# Patient Record
Sex: FEMALE | Race: WHITE | HISPANIC OR LATINO | Employment: FULL TIME | URBAN - METROPOLITAN AREA
[De-identification: names, ages, dates, MRNs, and addresses within clinical notes are randomized per-mention and may not be internally consistent; named-entity substitution may affect disease eponyms.]

---

## 2018-12-28 ENCOUNTER — APPOINTMENT (OUTPATIENT)
Dept: CT IMAGING | Facility: CLINIC | Age: 53
End: 2018-12-28
Attending: EMERGENCY MEDICINE

## 2018-12-28 ENCOUNTER — NURSE TRIAGE (OUTPATIENT)
Dept: NURSING | Facility: CLINIC | Age: 53
End: 2018-12-28

## 2018-12-28 ENCOUNTER — HOSPITAL ENCOUNTER (EMERGENCY)
Facility: CLINIC | Age: 53
Discharge: HOME | End: 2018-12-28
Attending: EMERGENCY MEDICINE | Admitting: EMERGENCY MEDICINE

## 2018-12-28 ENCOUNTER — OFFICE VISIT (OUTPATIENT)
Dept: URGENT CARE | Facility: URGENT CARE | Age: 53
End: 2018-12-28

## 2018-12-28 VITALS
TEMPERATURE: 97.3 F | WEIGHT: 252 LBS | SYSTOLIC BLOOD PRESSURE: 132 MMHG | HEART RATE: 67 BPM | DIASTOLIC BLOOD PRESSURE: 82 MMHG | OXYGEN SATURATION: 96 %

## 2018-12-28 VITALS
DIASTOLIC BLOOD PRESSURE: 77 MMHG | WEIGHT: 251.32 LBS | HEART RATE: 72 BPM | TEMPERATURE: 97.3 F | OXYGEN SATURATION: 97 % | SYSTOLIC BLOOD PRESSURE: 120 MMHG

## 2018-12-28 DIAGNOSIS — R10.9 ABDOMINAL PAIN, RIGHT LATERAL: ICD-10-CM

## 2018-12-28 DIAGNOSIS — R10.11 RUQ ABDOMINAL PAIN: Primary | ICD-10-CM

## 2018-12-28 LAB
ALBUMIN SERPL-MCNC: 3.8 G/DL (ref 3.4–5)
ALBUMIN UR-MCNC: NEGATIVE MG/DL
ALP SERPL-CCNC: 71 U/L (ref 40–150)
ALT SERPL W P-5'-P-CCNC: 48 U/L (ref 0–50)
ANION GAP SERPL CALCULATED.3IONS-SCNC: 9 MMOL/L (ref 3–14)
APPEARANCE UR: ABNORMAL
AST SERPL W P-5'-P-CCNC: 36 U/L (ref 0–45)
BACTERIA #/AREA URNS HPF: ABNORMAL /HPF
BASOPHILS # BLD AUTO: 0 10E9/L (ref 0–0.2)
BASOPHILS NFR BLD AUTO: 0.9 %
BILIRUB SERPL-MCNC: 0.6 MG/DL (ref 0.2–1.3)
BILIRUB UR QL STRIP: NEGATIVE
BUN SERPL-MCNC: 13 MG/DL (ref 7–30)
CALCIUM SERPL-MCNC: 8.8 MG/DL (ref 8.5–10.1)
CHLORIDE SERPL-SCNC: 105 MMOL/L (ref 94–109)
CO2 SERPL-SCNC: 25 MMOL/L (ref 20–32)
COLOR UR AUTO: YELLOW
CREAT SERPL-MCNC: 0.84 MG/DL (ref 0.52–1.04)
DIFFERENTIAL METHOD BLD: NORMAL
EOSINOPHIL # BLD AUTO: 0.1 10E9/L (ref 0–0.7)
EOSINOPHIL NFR BLD AUTO: 2.9 %
ERYTHROCYTE [DISTWIDTH] IN BLOOD BY AUTOMATED COUNT: 12.7 % (ref 10–15)
GFR SERPL CREATININE-BSD FRML MDRD: 78 ML/MIN/{1.73_M2}
GLUCOSE SERPL-MCNC: 100 MG/DL (ref 70–99)
GLUCOSE UR STRIP-MCNC: NEGATIVE MG/DL
HCT VFR BLD AUTO: 42.5 % (ref 35–47)
HGB BLD-MCNC: 14.3 G/DL (ref 11.7–15.7)
HGB UR QL STRIP: NEGATIVE
HYALINE CASTS #/AREA URNS LPF: 1 /LPF (ref 0–2)
IMM GRANULOCYTES # BLD: 0 10E9/L (ref 0–0.4)
IMM GRANULOCYTES NFR BLD: 0.2 %
KETONES UR STRIP-MCNC: 5 MG/DL
LEUKOCYTE ESTERASE UR QL STRIP: NEGATIVE
LIPASE SERPL-CCNC: 97 U/L (ref 73–393)
LYMPHOCYTES # BLD AUTO: 1.9 10E9/L (ref 0.8–5.3)
LYMPHOCYTES NFR BLD AUTO: 42.2 %
MCH RBC QN AUTO: 31.2 PG (ref 26.5–33)
MCHC RBC AUTO-ENTMCNC: 33.6 G/DL (ref 31.5–36.5)
MCV RBC AUTO: 93 FL (ref 78–100)
MONOCYTES # BLD AUTO: 0.4 10E9/L (ref 0–1.3)
MONOCYTES NFR BLD AUTO: 9.7 %
MUCOUS THREADS #/AREA URNS LPF: PRESENT /LPF
NEUTROPHILS # BLD AUTO: 2 10E9/L (ref 1.6–8.3)
NEUTROPHILS NFR BLD AUTO: 44.1 %
NITRATE UR QL: NEGATIVE
NRBC # BLD AUTO: 0 10*3/UL
NRBC BLD AUTO-RTO: 0 /100
PH UR STRIP: 6 PH (ref 5–7)
PLATELET # BLD AUTO: 212 10E9/L (ref 150–450)
POTASSIUM SERPL-SCNC: 4 MMOL/L (ref 3.4–5.3)
PROT SERPL-MCNC: 7.7 G/DL (ref 6.8–8.8)
RBC # BLD AUTO: 4.59 10E12/L (ref 3.8–5.2)
RBC #/AREA URNS AUTO: 1 /HPF (ref 0–2)
SODIUM SERPL-SCNC: 139 MMOL/L (ref 133–144)
SOURCE: ABNORMAL
SP GR UR STRIP: 1.02 (ref 1–1.03)
SQUAMOUS #/AREA URNS AUTO: 4 /HPF (ref 0–1)
UROBILINOGEN UR STRIP-MCNC: 0 MG/DL (ref 0–2)
WBC # BLD AUTO: 4.5 10E9/L (ref 4–11)
WBC #/AREA URNS AUTO: 3 /HPF (ref 0–5)

## 2018-12-28 PROCEDURE — 74177 CT ABD & PELVIS W/CONTRAST: CPT

## 2018-12-28 PROCEDURE — 25000128 H RX IP 250 OP 636: Performed by: EMERGENCY MEDICINE

## 2018-12-28 PROCEDURE — 81001 URINALYSIS AUTO W/SCOPE: CPT | Performed by: EMERGENCY MEDICINE

## 2018-12-28 PROCEDURE — 25000132 ZZH RX MED GY IP 250 OP 250 PS 637: Performed by: EMERGENCY MEDICINE

## 2018-12-28 PROCEDURE — 99285 EMERGENCY DEPT VISIT HI MDM: CPT | Mod: 25

## 2018-12-28 PROCEDURE — 85025 COMPLETE CBC W/AUTO DIFF WBC: CPT | Performed by: EMERGENCY MEDICINE

## 2018-12-28 PROCEDURE — 96374 THER/PROPH/DIAG INJ IV PUSH: CPT | Mod: 59

## 2018-12-28 PROCEDURE — 80053 COMPREHEN METABOLIC PANEL: CPT | Performed by: EMERGENCY MEDICINE

## 2018-12-28 PROCEDURE — 83690 ASSAY OF LIPASE: CPT | Performed by: EMERGENCY MEDICINE

## 2018-12-28 PROCEDURE — 96361 HYDRATE IV INFUSION ADD-ON: CPT

## 2018-12-28 RX ORDER — TRAZODONE HYDROCHLORIDE 100 MG/1
100 TABLET ORAL AT BEDTIME
COMMUNITY
End: 2021-08-26

## 2018-12-28 RX ORDER — LOVASTATIN 20 MG
20 TABLET ORAL AT BEDTIME
COMMUNITY
End: 2021-06-28

## 2018-12-28 RX ORDER — METHOCARBAMOL 750 MG/1
750 TABLET, FILM COATED ORAL 4 TIMES DAILY PRN
Qty: 20 TABLET | Refills: 0 | Status: SHIPPED | OUTPATIENT
Start: 2018-12-28 | End: 2019-01-27

## 2018-12-28 RX ORDER — ACETAMINOPHEN 500 MG
1000 TABLET ORAL ONCE
Status: COMPLETED | OUTPATIENT
Start: 2018-12-28 | End: 2018-12-28

## 2018-12-28 RX ORDER — ONDANSETRON 4 MG/1
4 TABLET, ORALLY DISINTEGRATING ORAL EVERY 8 HOURS PRN
Qty: 10 TABLET | Refills: 0 | Status: SHIPPED | OUTPATIENT
Start: 2018-12-28 | End: 2018-12-31

## 2018-12-28 RX ORDER — KETOROLAC TROMETHAMINE 15 MG/ML
15 INJECTION, SOLUTION INTRAMUSCULAR; INTRAVENOUS ONCE
Status: COMPLETED | OUTPATIENT
Start: 2018-12-28 | End: 2018-12-28

## 2018-12-28 RX ORDER — IOPAMIDOL 755 MG/ML
500 INJECTION, SOLUTION INTRAVASCULAR ONCE
Status: COMPLETED | OUTPATIENT
Start: 2018-12-28 | End: 2018-12-28

## 2018-12-28 RX ADMIN — SODIUM CHLORIDE, POTASSIUM CHLORIDE, SODIUM LACTATE AND CALCIUM CHLORIDE 1000 ML: 600; 310; 30; 20 INJECTION, SOLUTION INTRAVENOUS at 13:10

## 2018-12-28 RX ADMIN — IOPAMIDOL 100 ML: 755 INJECTION, SOLUTION INTRAVENOUS at 15:41

## 2018-12-28 RX ADMIN — KETOROLAC TROMETHAMINE 15 MG: 15 INJECTION, SOLUTION INTRAMUSCULAR; INTRAVENOUS at 13:06

## 2018-12-28 RX ADMIN — ACETAMINOPHEN 1000 MG: 500 TABLET, FILM COATED ORAL at 13:06

## 2018-12-28 RX ADMIN — SODIUM CHLORIDE 65 ML: 9 INJECTION, SOLUTION INTRAVENOUS at 15:41

## 2018-12-28 SDOH — HEALTH STABILITY: MENTAL HEALTH: HOW OFTEN DO YOU HAVE A DRINK CONTAINING ALCOHOL?: MONTHLY OR LESS

## 2018-12-28 ASSESSMENT — ENCOUNTER SYMPTOMS
ABDOMINAL PAIN: 1
BACK PAIN: 1
BLOOD IN STOOL: 0
FEVER: 0
CONSTIPATION: 1

## 2018-12-28 NOTE — TELEPHONE ENCOUNTER
"Patient states she was seen in [facility] this morning.   States she was sent to [facility] for an Ultrasound and has been waiting 3 hours due to \"they didn't send an order.\"  This RN reviewed Epic chart note and called [facility].   Per Dr. Tong and chart note Patient is to be evaluated in ED.     Protocol-  Information Call Only- Adult  Care advice reviewed.   This RN called [facility] back- line and obtained clarification per Dr. Tong as to plan of care. Patient to be evaluated in ED.   Disposition- Information given. This RN called Patient.  answered stating Patient is in the ED now. No further information shared with caller.  Advised to call back if further questions or concerns.     MINA KimN RN  [facility]     Additional Information    General information question, no triage required and triager able to answer question    Protocols used: INFORMATION ONLY CALL-ADULT-      "

## 2018-12-28 NOTE — DISCHARGE INSTRUCTIONS
Please make an appointment to follow up with your primary care provider in 2-3 days even if entirely better.    Use Tylenol and/or Ibuprofen for pain or discomfort        Discharge Instructions  Abdominal Pain    Abdominal pain (belly pain) can be caused by many things. Your evaluation today does not show the exact cause for your pain. Your provider today has decided that it is unlikely your pain is due to a life threatening problem, or a problem requiring surgery or hospital admission. Sometimes those problems cannot be found right away, so it is very important that you follow up as directed.  Sometimes only the changes which occur over time allow the cause of your pain to be found.    Generally, every Emergency Department visit should have a follow-up clinic visit with either a primary or a specialty clinic/provider. Please follow-up as instructed by your emergency provider today. With abdominal pain, we often recommend very close follow-up, such as the following day.    ADULTS:  Return to the Emergency Department right away if:    You get an oral temperature above 102oF or as directed by your provider.  You have blood in your stools. This may be bright red or appear as black, tarry stools.    You keep vomiting (throwing up) or cannot drink liquids.  You see blood when you vomit.   You cannot have a bowel movement or you cannot pass gas.  Your stomach gets bloated or bigger.  Your skin or the whites of your eyes look yellow.  You faint.  You have bloody, frequent or painful urination (peeing).  You have new symptoms or anything that worries you.    CHILDREN:  Return to the Emergency Department right away if your child has any of the above-listed symptoms or the following:    Pushes your hand away or screams/cries when his/her belly is touched.  You notice your child is very fussy or weak.  Your child is very tired and is too tired to eat or drink.  Your child is dehydrated.  Signs of dehydration can  be:  Significant change in the amount of wet diapers/urine.  Your infant or child starts to have dry mouth and lips, or no saliva (spit) or tears.    PREGNANT WOMEN:  Return to the Emergency Department right away if you have any of the above-listed symptoms or the following:    You have bleeding, leaking fluid or passing tissue from the vagina.  You have worse pain or cramping, or pain in your shoulder or back.  You have vomiting that will not stop.  You have a temperature of 100oF or more.  Your baby is not moving as much as usual.  You faint.  You get a bad headache with or without eye problems and abdominal pain.  You have a seizure.  You have unusual discharge from your vagina and abdominal pain.    Abdominal pain is pretty common during pregnancy.  Your pain may or may not be related to your pregnancy. You should follow-up closely with your OB provider so they can evaluate you and your baby.  Until you follow-up with your regular provider, do the following:     Avoid sex and do not put anything in your vagina.  Drink clear fluids.  Only take medications approved by your provider.    MORE INFORMATION:    Appendicitis:  A possible cause of abdominal pain in any person who still has their appendix is acute appendicitis. Appendicitis is often hard to diagnose.  Testing does not always rule out early appendicitis or other causes of abdominal pain. Close follow-up with your provider and re-evaluations may be needed to figure out the reason for your abdominal pain.    Follow-up:  It is very important that you make an appointment with your clinic and go to the appointment.  If you do not follow-up with your primary provider, it may result in missing an important development which could result in permanent injury or disability and/or lasting pain.  If there is any problem keeping your appointment, call your provider or return to the Emergency Department.    Medications:  Take your medications as directed by your  "provider today.  Before using over-the-counter medications, ask your provider and make sure to take the medications as directed.  If you have any questions about medications, ask your provider.    Diet:  Resume your normal diet as much as possible, but do not eat fried, fatty or spicy foods while you have pain.  Do not drink alcohol or have caffeine.  Do not smoke tobacco.    Probiotics: If you have been given an antibiotic, you may want to also take a probiotic pill or eat yogurt with live cultures. Probiotics have \"good bacteria\" to help your intestines stay healthy. Studies have shown that probiotics help prevent diarrhea (loose stools) and other intestine problems (including C. diff infection) when you take antibiotics. You can buy these without a prescription in the pharmacy section of the store.     If you were given a prescription for medicine here today, be sure to read all of the information (including the package insert) that comes with your prescription.  This will include important information about the medicine, its side effects, and any warnings that you need to know about.  The pharmacist who fills the prescription can provide more information and answer questions you may have about the medicine.  If you have questions or concerns that the pharmacist cannot address, please call or return to the Emergency Department.       Remember that you can always come back to the Emergency Department if you are not able to see your regular provider in the amount of time listed above, if you get any new symptoms, or if there is anything that worries you.    "

## 2018-12-28 NOTE — PROGRESS NOTES
THIS IS A TRIAGE ENCOUNTER    HPI:  Nora Og is a 53 year old female with a history of Irritable Bowel Syndrome.  She presents with the CC of constant dull RUQ pain with overlying severe off-and-on stabbing pain lasting 30 seconds. Patient has been constipated for four years.  Patient has been producing a lot of intestinal gas, and incomplete bowel movements this morning.  The stools have been dark green without any blood.     No urine problems.  No vomiting.  Patient has had mild nausea (off-and-on).     I am concerned about cholelithiasis/choledocolithiasis given the severity of the right upper quadrant abdominal pain.  Patient will go to the emergency room for further evaluation.  Patient may need ultrasound imaging to look for stones.     Kelvin Tong MD

## 2018-12-28 NOTE — ED AVS SNAPSHOT
[facility] Emergency Department  [address]  [facility] [address]  Phone:  [phone]  Fax:  [phone]                                    Nora Og   MRN: [id number]    Department:  [facility] Emergency Department   Date of Visit:  12/28/2018           After Visit Summary Signature Page    I have received my discharge instructions, and my questions have been answered. I have discussed any challenges I see with this plan with the nurse or doctor.    ..........................................................................................................................................  Patient/Patient Representative Signature      ..........................................................................................................................................  Patient Representative Print Name and Relationship to Patient    ..................................................               ................................................  Date                                   Time    ..........................................................................................................................................  Reviewed by Signature/Title    ...................................................              ..............................................  Date                                               Time          22EPIC Rev 08/18

## 2018-12-28 NOTE — ED TRIAGE NOTES
Pt presents with right upper quad pain that has exacerbated over the last three days.  Pt believes it started on [place].  Pt denies any past abd surgery.  A/O x4, ABC intact.

## 2018-12-28 NOTE — ED PROVIDER NOTES
History     Chief Complaint:  Abdominal Pain    HPI   Nora Og is a 53 year old female who presents to the emergency department for evaluation of RUQ abdominal pain present for the past 3 days. The patient states that she has had intermittent pain for the past 3 days, and notes something similar that happened 4 years ago. She had MRI, CT, lower and upper endoscopies that were negative for obvious cause. She states that since onset there does not seem to be a pattern to her pain. The patient endorses some back pain as well. She notes that her bowel movements seem to be unaffected aside from some very temporary constipation. She denies any black or bloody stool, or fever.     Allergies:  No known Drug Allergies      Medications:    lovastatin (MEVACOR) 20 MG tablet  traZODone (DESYREL) 100 MG tablet    Past Medical History:    Past medical history reviewed. No pertinent history.     Past Surgical History:    Past surgical history reviewed. No pertinent history.     Family History:    History reviewed. No pertinent family history.     Social History:  Marital Status:   [2]  Social History     Tobacco Use     Smoking status: Former Smoker     Smokeless tobacco: Never Used   Substance Use Topics     Alcohol use: Yes     Frequency: Monthly or less     Drug use: No        Review of Systems   Constitutional: Negative for fever.   Gastrointestinal: Positive for abdominal pain and constipation. Negative for blood in stool.   Musculoskeletal: Positive for back pain.   All other systems reviewed and are negative.        Physical Exam     Patient Vitals for the past 24 hrs:   BP Temp Temp src Pulse SpO2 Weight   12/28/18 1300 -- -- -- 74 -- --   12/28/18 1030 (!) 126/94 -- -- -- -- --   12/28/18 1025 -- 97.3  F (36.3  C) Temporal (!) 16 98 % --   12/28/18 1024 -- -- -- -- -- 114 kg (251 lb 5.2 oz)        Physical Exam  Gen: in NAD  HEENT:  Mmm, no rhinorrhea  Neck: supple, no abnormal swelling or  tenderness  Lungs:  CTAB,  no resp distress  CV: rrr, no m/r/g, ppi  Abd: soft, tenderness right mid abdomen no palpable mass, negative Lea sign, nondistended, no rebound/masses/guarding/hsm  Ext: no peripheral edema  Skin: warm, dry, well perfused, no rashes/bruising/lesions on exposed skin  Neuro: alert, MAEE, no gross motor or sensory deficits, gait stable  Psych: Normal mood, normal affect      Emergency Department Course         Imaging:  Radiology findings were communicated with the patient who voiced understanding of the findings.    Abd/pelvis CT,  IV  contrast only TRAUMA / AAA    (Results Pending)     Laboratory:  Laboratory findings were communicated with the  who voiced understanding of the findings.    Labs Ordered and Resulted from Time of ED Arrival Up to the Time of Departure from the ED   COMPREHENSIVE METABOLIC PANEL - Abnormal; Notable for the following components:       Result Value    Glucose 100 (*)     All other components within normal limits   CBC WITH PLATELETS DIFFERENTIAL   LIPASE   ROUTINE UA WITH MICROSCOPIC         Procedures:      Interventions:  Lactated ringers 1000 mls IV  Toradol 15 mcg IV  Tylenol 1000 mg PO  Medications   lactated ringers BOLUS 1,000 mL (1,000 mLs Intravenous New Bag 12/28/18 1310)   ketorolac (TORADOL) injection 15 mg (15 mg Intravenous Given 12/28/18 1306)   acetaminophen (TYLENOL) tablet 1,000 mg (1,000 mg Oral Given 12/28/18 1306)     Emergency Department Course:  Nursing notes and vitals reviewed.  I performed an exam of the patient as documented above.         Impression & Plan      Medical Decision Making:  Differential includes hepatobiliary etiologies, pancreatitis, ureteral stone, renal infarction, appendicitis, right-sided diverticulitis, GYN pathology, pyelonephritis.  I think it is unlikely a vascular catastrophe or referred cardiopulmonary process.  We will do basic blood work urinalysis and a CT scan of the abdomen and pelvis.    She had a  similar episode 4 years ago in which she ended up having CT, ultrasound, MRI, HIDA scan, EGD and colonoscopy with no etiology found.  If workup again today is unremarkable for significant surgical, vascular, infectious etiology I think she can safely be discharged home and treated symptomatically follow-up with her PCP to determine if repeat MRI, HIDA scan, endoscopy/colonoscopy would be beneficial.    Diagnosis:      ICD-10-CM    1. Abdominal pain, right lateral R10.9         Disposition:   Pending      Discharge Medications:    Scribe Disclosure:  I, Tao Perez, am serving as a scribe at 1:50 PM on 12/28/2018 to document services personally performed by Sanket Caballero MD* based on my observations and the provider's statements to me.   [facility] EMERGENCY DEPARTMENT       Sanket Caballero MD  12/28/18 2367

## 2018-12-28 NOTE — ED PROVIDER NOTES
I reviewed the CT results with patient. There is no findings to suggest a cause of her pain.  Recommended antiinflammatories and muscle relaxant.  Follow up with PCP.     Cristopher Song MD  12/28/18 4940

## 2018-12-29 ENCOUNTER — HOSPITAL ENCOUNTER (EMERGENCY)
Facility: CLINIC | Age: 53
Discharge: HOME | End: 2018-12-29
Attending: EMERGENCY MEDICINE | Admitting: EMERGENCY MEDICINE

## 2018-12-29 ENCOUNTER — APPOINTMENT (OUTPATIENT)
Dept: ULTRASOUND IMAGING | Facility: CLINIC | Age: 53
End: 2018-12-29
Attending: EMERGENCY MEDICINE

## 2018-12-29 VITALS
OXYGEN SATURATION: 95 % | DIASTOLIC BLOOD PRESSURE: 83 MMHG | TEMPERATURE: 98.5 F | HEART RATE: 74 BPM | RESPIRATION RATE: 16 BRPM | SYSTOLIC BLOOD PRESSURE: 98 MMHG

## 2018-12-29 DIAGNOSIS — R10.11 ABDOMINAL PAIN, RIGHT UPPER QUADRANT: ICD-10-CM

## 2018-12-29 LAB
ALBUMIN SERPL-MCNC: 3.5 G/DL (ref 3.4–5)
ALBUMIN UR-MCNC: NEGATIVE MG/DL
ALP SERPL-CCNC: 67 U/L (ref 40–150)
ALT SERPL W P-5'-P-CCNC: 45 U/L (ref 0–50)
ANION GAP SERPL CALCULATED.3IONS-SCNC: 9 MMOL/L (ref 3–14)
APPEARANCE UR: CLEAR
AST SERPL W P-5'-P-CCNC: 28 U/L (ref 0–45)
BASOPHILS # BLD AUTO: 0 10E9/L (ref 0–0.2)
BASOPHILS NFR BLD AUTO: 0.6 %
BILIRUB SERPL-MCNC: 0.4 MG/DL (ref 0.2–1.3)
BILIRUB UR QL STRIP: NEGATIVE
BUN SERPL-MCNC: 17 MG/DL (ref 7–30)
CALCIUM SERPL-MCNC: 8.9 MG/DL (ref 8.5–10.1)
CHLORIDE SERPL-SCNC: 106 MMOL/L (ref 94–109)
CO2 SERPL-SCNC: 25 MMOL/L (ref 20–32)
COLOR UR AUTO: YELLOW
CREAT SERPL-MCNC: 1.05 MG/DL (ref 0.52–1.04)
D DIMER PPP FEU-MCNC: 0.3 UG/ML FEU (ref 0–0.5)
DIFFERENTIAL METHOD BLD: NORMAL
EOSINOPHIL # BLD AUTO: 0.1 10E9/L (ref 0–0.7)
EOSINOPHIL NFR BLD AUTO: 2.5 %
ERYTHROCYTE [DISTWIDTH] IN BLOOD BY AUTOMATED COUNT: 12.5 % (ref 10–15)
GFR SERPL CREATININE-BSD FRML MDRD: 59 ML/MIN/{1.73_M2}
GLUCOSE SERPL-MCNC: 90 MG/DL (ref 70–99)
GLUCOSE UR STRIP-MCNC: NEGATIVE MG/DL
HCT VFR BLD AUTO: 40.3 % (ref 35–47)
HGB BLD-MCNC: 13.5 G/DL (ref 11.7–15.7)
HGB UR QL STRIP: NEGATIVE
IMM GRANULOCYTES # BLD: 0 10E9/L (ref 0–0.4)
IMM GRANULOCYTES NFR BLD: 0.2 %
KETONES UR STRIP-MCNC: NEGATIVE MG/DL
LEUKOCYTE ESTERASE UR QL STRIP: NEGATIVE
LIPASE SERPL-CCNC: 128 U/L (ref 73–393)
LYMPHOCYTES # BLD AUTO: 1.6 10E9/L (ref 0.8–5.3)
LYMPHOCYTES NFR BLD AUTO: 32.9 %
MCH RBC QN AUTO: 31.3 PG (ref 26.5–33)
MCHC RBC AUTO-ENTMCNC: 33.5 G/DL (ref 31.5–36.5)
MCV RBC AUTO: 93 FL (ref 78–100)
MONOCYTES # BLD AUTO: 0.4 10E9/L (ref 0–1.3)
MONOCYTES NFR BLD AUTO: 8.5 %
MUCOUS THREADS #/AREA URNS LPF: PRESENT /LPF
NEUTROPHILS # BLD AUTO: 2.7 10E9/L (ref 1.6–8.3)
NEUTROPHILS NFR BLD AUTO: 55.3 %
NITRATE UR QL: NEGATIVE
NRBC # BLD AUTO: 0 10*3/UL
NRBC BLD AUTO-RTO: 0 /100
PH UR STRIP: 5 PH (ref 5–7)
PLATELET # BLD AUTO: 202 10E9/L (ref 150–450)
POTASSIUM SERPL-SCNC: 3.9 MMOL/L (ref 3.4–5.3)
PROT SERPL-MCNC: 7.1 G/DL (ref 6.8–8.8)
RBC # BLD AUTO: 4.32 10E12/L (ref 3.8–5.2)
RBC #/AREA URNS AUTO: 1 /HPF (ref 0–2)
SODIUM SERPL-SCNC: 140 MMOL/L (ref 133–144)
SOURCE: ABNORMAL
SP GR UR STRIP: 1.02 (ref 1–1.03)
SQUAMOUS #/AREA URNS AUTO: 2 /HPF (ref 0–1)
TROPONIN I SERPL-MCNC: <0.015 UG/L (ref 0–0.04)
UROBILINOGEN UR STRIP-MCNC: 0 MG/DL (ref 0–2)
WBC # BLD AUTO: 4.8 10E9/L (ref 4–11)
WBC #/AREA URNS AUTO: 1 /HPF (ref 0–5)

## 2018-12-29 PROCEDURE — 96374 THER/PROPH/DIAG INJ IV PUSH: CPT

## 2018-12-29 PROCEDURE — 93005 ELECTROCARDIOGRAM TRACING: CPT

## 2018-12-29 PROCEDURE — 85379 FIBRIN DEGRADATION QUANT: CPT | Performed by: EMERGENCY MEDICINE

## 2018-12-29 PROCEDURE — 99285 EMERGENCY DEPT VISIT HI MDM: CPT | Mod: 25

## 2018-12-29 PROCEDURE — 83690 ASSAY OF LIPASE: CPT | Performed by: EMERGENCY MEDICINE

## 2018-12-29 PROCEDURE — 96361 HYDRATE IV INFUSION ADD-ON: CPT

## 2018-12-29 PROCEDURE — 84484 ASSAY OF TROPONIN QUANT: CPT | Performed by: EMERGENCY MEDICINE

## 2018-12-29 PROCEDURE — 96375 TX/PRO/DX INJ NEW DRUG ADDON: CPT

## 2018-12-29 PROCEDURE — 80053 COMPREHEN METABOLIC PANEL: CPT | Performed by: EMERGENCY MEDICINE

## 2018-12-29 PROCEDURE — 85025 COMPLETE CBC W/AUTO DIFF WBC: CPT | Performed by: EMERGENCY MEDICINE

## 2018-12-29 PROCEDURE — 81001 URINALYSIS AUTO W/SCOPE: CPT | Performed by: EMERGENCY MEDICINE

## 2018-12-29 PROCEDURE — 76705 ECHO EXAM OF ABDOMEN: CPT

## 2018-12-29 PROCEDURE — 25000128 H RX IP 250 OP 636: Performed by: EMERGENCY MEDICINE

## 2018-12-29 RX ORDER — SODIUM CHLORIDE 9 MG/ML
1000 INJECTION, SOLUTION INTRAVENOUS CONTINUOUS
Status: DISCONTINUED | OUTPATIENT
Start: 2018-12-29 | End: 2018-12-29 | Stop reason: HOSPADM

## 2018-12-29 RX ORDER — MORPHINE SULFATE 4 MG/ML
4 INJECTION, SOLUTION INTRAMUSCULAR; INTRAVENOUS ONCE
Status: COMPLETED | OUTPATIENT
Start: 2018-12-29 | End: 2018-12-29

## 2018-12-29 RX ORDER — TRAMADOL HYDROCHLORIDE 50 MG/1
50 TABLET ORAL EVERY 6 HOURS PRN
Qty: 10 TABLET | Refills: 0 | Status: SHIPPED | OUTPATIENT
Start: 2018-12-29 | End: 2019-01-01

## 2018-12-29 RX ORDER — ONDANSETRON 2 MG/ML
4 INJECTION INTRAMUSCULAR; INTRAVENOUS ONCE
Status: COMPLETED | OUTPATIENT
Start: 2018-12-29 | End: 2018-12-29

## 2018-12-29 RX ADMIN — ONDANSETRON 4 MG: 2 INJECTION INTRAMUSCULAR; INTRAVENOUS at 16:08

## 2018-12-29 RX ADMIN — MORPHINE SULFATE 4 MG: 4 INJECTION INTRAVENOUS at 16:11

## 2018-12-29 RX ADMIN — SODIUM CHLORIDE 1000 ML: 9 INJECTION, SOLUTION INTRAVENOUS at 16:08

## 2018-12-29 ASSESSMENT — ENCOUNTER SYMPTOMS
VOMITING: 0
DIARRHEA: 0
ABDOMINAL PAIN: 1
SHORTNESS OF BREATH: 0
NAUSEA: 0
HEMATURIA: 0
DIAPHORESIS: 1
BACK PAIN: 1
CHILLS: 1
DYSURIA: 0

## 2018-12-29 NOTE — ED AVS SNAPSHOT
[facility] Emergency Department  [address]  [facility] [address]  Phone:  [phone]  Fax:  [phone]                                    Nora Og   MRN: [id number]    Department:  [facility] Emergency Department   Date of Visit:  12/29/2018           After Visit Summary Signature Page    I have received my discharge instructions, and my questions have been answered. I have discussed any challenges I see with this plan with the nurse or doctor.    ..........................................................................................................................................  Patient/Patient Representative Signature      ..........................................................................................................................................  Patient Representative Print Name and Relationship to Patient    ..................................................               ................................................  Date                                   Time    ..........................................................................................................................................  Reviewed by Signature/Title    ...................................................              ..............................................  Date                                               Time          22EPIC Rev 08/18

## 2018-12-29 NOTE — ED TRIAGE NOTES
Presents to the ED with right sided abdominal pain. Seen yesterday for same symptoms. States pain is now much worse and constant.

## 2018-12-29 NOTE — ED PROVIDER NOTES
History     Chief Complaint:  Abdominal pain    The history is provided by the patient.      Nora Og is a 53 year old female with history of IBS with constipation and hyperlipidemia who presents with ongoing abdominal pain for the past 4 days. The patient reports that she had an onset of waxing and waning abdominal pain 4 days ago that has gradually gotten more severe until today. The patient was seen in the ED yesterday for this pain, where a CT scan was negative for acute findings, as per the note below. The patient states that today, her abdominal pain is more severe than yesterday. She reports that the pain is radiating into her back. She denies dysuria or hematuria. She states that she has felt feverish and chills on and off.     Of note, the patient had a similar episode 4 years ago in which she ended up having CT, ultrasound, MRI, HIDA scan, EGD and colonoscopy with no etiology found.     CT ABDOMEN AND PELVIS WITH CONTRAST  12/28/2018  3:51 PM   IMPRESSION:  1. Slight thick-walled appearance involving portions of the descending  and sigmoid colon is probably physiologic.  2. No other acute findings or cause of pain identified.  3. Colonic diverticulosis.  4. Enlarged liver with fatty infiltration.  5. Small left adrenal nodule of doubtful significance.    Allergies:  No known drug allergies      Medications:    Lovastatin  Robaxin  Trazodone     Past Medical History:    IBS with constipation  Hyperlipidemia     Past Surgical History:    History reviewed. No pertinent surgical history.     Family History:    Patient is adopted- family Hx unknown     Social History:  Marital Status:    Smoking status: former smoker  Alcohol use: yes, occasionally       Review of Systems   Constitutional: Positive for chills and diaphoresis.   Respiratory: Negative for shortness of breath.    Cardiovascular: Negative for chest pain and leg swelling.   Gastrointestinal: Positive for abdominal pain. Negative  for diarrhea, nausea and vomiting.   Genitourinary: Negative for dysuria, hematuria, vaginal bleeding and vaginal discharge.   Musculoskeletal: Positive for back pain.   All other systems reviewed and are negative.      Physical Exam     Patient Vitals for the past 24 hrs:   BP Temp Temp src Pulse Resp SpO2   12/29/18 1700 119/77 -- -- 65 -- 96 %   12/29/18 1645 123/78 -- -- 72 -- 98 %   12/29/18 1630 122/82 -- -- 66 -- 96 %   12/29/18 1615 118/82 -- -- 69 -- 93 %   12/29/18 1600 110/70 -- -- 77 -- 97 %   12/29/18 1503 143/66 98.5  F (36.9  C) Temporal 70 16 97 %        Physical Exam  Gen:  Pleasant, appears stated age.    Eye:   Pupils are equal, round, and reactive.     Sclera non-injected.    ENT:   Moist mucus membranes.     Normal tongue.    Oropharynx without lesions.    Cardiac:     Normal rate and regular rhythm.    No murmurs, gallops, or rubs.    Pulmonary:     Clear to auscultation bilaterally.    No wheezes, rales, or rhonchi.    Abdomen:     RUQ tenderness.   Right Flank pain.    Negative Lea's sign.   Normal active bowel sounds.     Abdomen is soft and non-distended.    Musculoskeletal:     Normal movement of all extremities without evidence for deficit.    Extremities:    No edema.    Skin:   Diaphoretic. No crepitus. No rash over right flank.    Neurologic:    Non-focal exam without asymmetric weakness or numbness.    Normal tone    Psychiatric:     Normal affect with appropriate interaction with examiner.       Emergency Department Course     ECG (15:51:36):  Rate 78 bpm. IL interval 166. QRS duration 78. QT/QTc 373/430. P-R-T axes 67 13 53.   Normal sinus rhythm  ST & T wave abnormality, consider anterior ischemia  Abnormal ECG  Interpreted at 1600 by Nieves Will MD.     Imaging:  Radiographic findings were communicated with the patient who voiced understanding of the findings.    US Abdomen Limited  Impression: 1. Normal gallbladder.  2. Fatty infiltration of the liver.  As read by  Radiology.     Laboratory:  CBC: WNL (WBC 4.8, HGB 13.5, )     Troponin I: <0.015    D dimer: 0.3    CMP: creatinine 1.05, GFR estimate 59    Lipase: 128    UA with microscopic: squamous epithelial/HPF 2, mucous urine present    Interventions:  1608: NS 1L IV Bolus   1608: Zofran 4 mg, IV  1611: Morphine 4 mg, IV  Medications   0.9% sodium chloride BOLUS (1,000 mLs Intravenous New Bag 12/29/18 1608)     Followed by   sodium chloride 0.9% infusion (not administered)   morphine (PF) injection 4 mg (4 mg Intravenous Given 12/29/18 1611)   ondansetron (ZOFRAN) injection 4 mg (4 mg Intravenous Given 12/29/18 1608)        Emergency Department Course:  Past medical records, nursing notes, and vitals reviewed.  1525: I performed an exam of the patient and obtained history, as documented above.  IV inserted and blood drawn.      1758: I rechecked the patient. Explained findings to the patient and .  She is comfortable.  Pain has resolved.         Impression & Plan      Medical Decision Making:  Nora Og is a 53 year old female with history of IBS and recurrent RUQ pain who presents today with worsening RUQ pain. On exam, she has normal vitals and benign exam. She has no rash to suggest herpes zoster. She had an extensive work up yesterday including CT abdomen/pelvis, which was essentially unremarkable. She was noted to have a fatty liver, which is again demonstrated on her RUQ ultrasound today. Otherwise she had some slightly thick walled appearance involving the descending and sigmoid colon which is not localized to where her pain is today. Otherwise, appendix yesterday was normal. Labs today are unchanged. She again has no signs of hepatitis. I broadened the differential to include PE given low chest, versus high abdominal pain. D dimer was negative which I think rules out PE in this otherwise low risk patient. At this point, I do not have a good explanation for the patient's symptoms. She has noted that  she has had extensive workup before for similar symptoms that essentially resolved on their own. I do not see any red flags or dangerous conditions today. She is frustrated by the lack of answers for her symptoms. We discussed that she does require additional workup given her persistent symptoms, but in regards to ED workup I do not think there is other imaging I would recommend at this time. She will follow up with PCP and will otherwise return to the ED for new or worsening symptoms.     Diagnosis:    ICD-10-CM    1. Abdominal pain, right upper quadrant R10.11        Disposition:  discharged to home    Discharge Medications:     Medication List      There are no discharge medications for this visit.           Megan Beh  12/29/2018   [facility] EMERGENCY DEPARTMENT  I, Megan Beh, am serving as a scribe at 3:25 PM on 12/29/2018 to document services personally performed by Nieves Will MD based on my observations and the provider's statements to me.       Nieves Will MD  12/29/18 1946

## 2018-12-31 LAB — INTERPRETATION ECG - MUSE: NORMAL

## 2019-02-21 ENCOUNTER — OFFICE VISIT (OUTPATIENT)
Dept: URGENT CARE | Facility: URGENT CARE | Age: 54
End: 2019-02-21
Payer: COMMERCIAL

## 2019-02-21 VITALS
TEMPERATURE: 97.8 F | WEIGHT: 230 LBS | HEART RATE: 78 BPM | OXYGEN SATURATION: 98 % | DIASTOLIC BLOOD PRESSURE: 76 MMHG | SYSTOLIC BLOOD PRESSURE: 110 MMHG | RESPIRATION RATE: 12 BRPM

## 2019-02-21 DIAGNOSIS — J20.9 ACUTE BRONCHITIS WITH SYMPTOMS > 10 DAYS: Primary | ICD-10-CM

## 2019-02-21 DIAGNOSIS — J01.90 ACUTE SINUSITIS WITH SYMPTOMS > 10 DAYS: ICD-10-CM

## 2019-02-21 PROCEDURE — 99213 OFFICE O/P EST LOW 20 MIN: CPT | Performed by: INTERNAL MEDICINE

## 2019-02-21 RX ORDER — DOXYCYCLINE HYCLATE 100 MG
100 TABLET ORAL 2 TIMES DAILY
Qty: 20 TABLET | Refills: 0 | COMMUNITY
Start: 2019-02-21 | End: 2021-06-28

## 2019-02-21 RX ORDER — ALBUTEROL SULFATE 90 UG/1
2 AEROSOL, METERED RESPIRATORY (INHALATION) EVERY 6 HOURS
Qty: 1 INHALER | Refills: 1 | Status: SHIPPED | OUTPATIENT
Start: 2019-02-21 | End: 2021-06-28

## 2019-02-21 NOTE — PATIENT INSTRUCTIONS
1) Doxycycline twice per day for 10 days for sinus infection and coverage for bronchitis    2) Take a probiotic twice per day to prevent diarrhea while on the antibiotic. Florastor and culturelle are common brands, but generics work just as well.     3) Watch for worsening symptoms such as fevers, increased cough/breathing issues, should do a chest xray and recheck then.    Nguyễn Barreto MD

## 2019-02-21 NOTE — PROGRESS NOTES
SUBJECTIVE:   Nora Og is a 53 year old female who presents to clinic today for the following health issues:    Patient is that she has had worsening symptoms over the last week.  Diagnosed with influenza about 3 weeks ago noted multiple sick contacts with influenza a at the same time as well.  She was diagnosed at the clinic in [facility].  Notes was out of work for a week with this with significant myalgias and symptoms.  Then symptoms were resolved for about a week and then for the last week she has had increased nasal congestion rhinorrhea sore throat ear plugging slight nasal drip and coughing with associated chest tightness at times.  She has not been having increased sputum production.  She does have a history of mild wheezing with colds in the past.  Uses albuterol intermittently in the past does not currently have right now.  She did receive a prescription for doxycycline before diagnosed with the flu and had not started it but does have a supply at home.  She tried [place] without great benefit.  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             Problem list and histories reviewed & adjusted, as indicated.  Additional history: as documented    There is no problem list on file for this patient.    History reviewed. No pertinent surgical history.    Social History     Tobacco Use     Smoking status: Former Smoker     Smokeless tobacco: Never Used   Substance Use Topics     Alcohol use: Yes     Frequency: Monthly or less     History reviewed. No pertinent family history.        Reviewed and updated as needed this visit by clinical staff  Tobacco  Allergies  Meds       Reviewed and updated as needed this visit by Provider         ROS:  Constitutional, HEENT, cardiovascular, pulmonary, GI, , musculoskeletal, neuro, skin, endocrine and psych systems are negative, except as otherwise noted.    OBJECTIVE:     /76   Pulse 78   Temp 97.8  F (36.6  C) (Oral)   Resp 12   Wt  104.3 kg (230 lb)   SpO2 98%   There is no height or weight on file to calculate BMI.  GENERAL: Fatigued but in no acute distress  EYES: Eyes grossly normal to inspection, PERRL and conjunctivae and sclerae normal  HENT: Noted nasal congestion mild maxillary sinus tenderness no frontal sinus tenderness.  Ears with slight fluid behind TMs nonerythematous.  Mouth with moist pink oral mucosa cobblestoned pharynx noted.  NECK: no adenopathy, no asymmetry, masses, or scars and thyroid normal to palpation  RESP: Occasional rhonchi slightly prolonged expiratory phase no overt wheezing.  CV: regular rate and rhythm, normal S1 S2, no S3 or S4, no murmur, click or rub, no peripheral edema and peripheral pulses strong  ABDOMEN: soft, nontender, no hepatosplenomegaly, no masses and bowel sounds normal  MS: no gross musculoskeletal defects noted, no edema    Diagnostic Test Results:  none     ASSESSMENT/PLAN:       ICD-10-CM    1. Acute bronchitis with symptoms > 10 days J20.9 albuterol (PROAIR HFA/PROVENTIL HFA/VENTOLIN HFA) 108 (90 Base) MCG/ACT inhaler   2. Acute sinusitis with symptoms > 10 days J01.90 albuterol (PROAIR HFA/PROVENTIL HFA/VENTOLIN HFA) 108 (90 Base) MCG/ACT inhaler     Discussed warning signs for recheck and need for x-ray if symptoms worsening will treat with doxycycline which patient has at home for sinusitis will give albuterol to help with acute symptoms no signs needing steroids right now discussed warning signs for need of this as well.    Patient Instructions   1) Doxycycline twice per day for 10 days for sinus infection and coverage for bronchitis    2) Take a probiotic twice per day to prevent diarrhea while on the antibiotic. Florastor and culturelle are common brands, but generics work just as well.     3) Watch for worsening symptoms such as fevers, increased cough/breathing issues, should do a chest xray and recheck then.    MD Nguyễn Gann MD, MD [facility]

## 2019-02-21 NOTE — NURSING NOTE
Chief Complaint   Patient presents with     Urgent Care     Flu     She was dx with influenza A a few weeks ago and took tamiflu x 5 days and felt better for a bit.  She feels like her chest is congested now and feeling SOB at times.  She has been coughing but it is nonproductive.  Blowing out lots of nasal mucous.  No fever today.       Initial /76   Pulse 78   Temp 97.8  F (36.6  C) (Oral)   Resp 12   Wt 104.3 kg (230 lb)   SpO2 98%  There is no height or weight on file to calculate BMI..  BP completed using cuff size: regular  Zahraa Landa R.N.

## 2019-03-18 DIAGNOSIS — N30.00 ACUTE CYSTITIS WITHOUT HEMATURIA: Primary | ICD-10-CM

## 2019-03-18 RX ORDER — CIPROFLOXACIN 500 MG/1
500 TABLET, FILM COATED ORAL 2 TIMES DAILY
Qty: 10 TABLET | Refills: 0 | Status: SHIPPED | OUTPATIENT
Start: 2019-03-18 | End: 2019-03-23

## 2019-10-02 PROCEDURE — 90686 IIV4 VACC NO PRSV 0.5 ML IM: CPT | Performed by: FAMILY MEDICINE

## 2019-10-02 PROCEDURE — 90471 IMMUNIZATION ADMIN: CPT | Performed by: FAMILY MEDICINE

## 2020-02-17 DIAGNOSIS — J06.9 BACTERIAL URI: Primary | ICD-10-CM

## 2020-02-17 DIAGNOSIS — B96.89 BACTERIAL URI: Primary | ICD-10-CM

## 2020-02-17 RX ORDER — AMOXICILLIN AND CLAVULANATE POTASSIUM 562.5; 437.5; 62.5 MG/1; MG/1; MG/1
2 TABLET, MULTILAYER, EXTENDED RELEASE ORAL 2 TIMES DAILY
Qty: 20 TABLET | Refills: 0 | Status: SHIPPED | OUTPATIENT
Start: 2020-02-17 | End: 2020-02-22

## 2020-04-20 DIAGNOSIS — S69.91XA WRIST INJURY, RIGHT, INITIAL ENCOUNTER: Primary | ICD-10-CM

## 2020-04-20 PROCEDURE — 73110 X-RAY EXAM OF WRIST: CPT

## 2020-04-20 NOTE — PROGRESS NOTES
DOI 4/8/2020 right wrist - fell- ok'd per Dylan - image seen by Gil Barros MA April 20, 2020 2:15 PM

## 2020-05-13 DIAGNOSIS — Z03.818 ENCOUNTER FOR OBSERVATION FOR SUSPECTED EXPOSURE TO OTHER BIOLOGICAL AGENTS RULED OUT: Primary | ICD-10-CM

## 2020-05-17 LAB
SARS COV2 ABY IGA: NEGATIVE
SARS COV2 ABY IGG: NEGATIVE
SARS COV2 ABY IGM: NEGATIVE

## 2020-08-24 DIAGNOSIS — S69.91XA INJURY OF RIGHT HAND, INITIAL ENCOUNTER: Primary | ICD-10-CM

## 2020-08-24 PROCEDURE — 73130 X-RAY EXAM OF HAND: CPT | Mod: RT | Performed by: FAMILY MEDICINE

## 2020-09-21 ENCOUNTER — IMMUNIZATION (OUTPATIENT)
Dept: FAMILY MEDICINE | Facility: CLINIC | Age: 55
End: 2020-09-21

## 2020-09-21 PROCEDURE — 90686 IIV4 VACC NO PRSV 0.5 ML IM: CPT | Performed by: FAMILY MEDICINE

## 2020-09-21 PROCEDURE — 90471 IMMUNIZATION ADMIN: CPT | Performed by: FAMILY MEDICINE

## 2020-12-15 DIAGNOSIS — Z20.822 CLOSE EXPOSURE TO SEVERE ACUTE RESPIRATORY SYNDROME CORONAVIRUS 2 (SARS-COV-2): Primary | ICD-10-CM

## 2020-12-15 PROCEDURE — 86769 SARS-COV-2 COVID-19 ANTIBODY: CPT | Mod: CS | Performed by: NURSE PRACTITIONER

## 2020-12-16 LAB — SARS-COV-2 ABY IGG IGA IGM: NEGATIVE

## 2021-01-03 ENCOUNTER — HEALTH MAINTENANCE LETTER (OUTPATIENT)
Age: 56
End: 2021-01-03

## 2021-01-21 ENCOUNTER — IMMUNIZATION (OUTPATIENT)
Dept: NURSING | Facility: CLINIC | Age: 56
End: 2021-01-21
Payer: COMMERCIAL

## 2021-01-21 PROCEDURE — 91300 PR COVID VAC PFIZER DIL RECON 30 MCG/0.3 ML IM: CPT

## 2021-01-21 PROCEDURE — 0001A PR COVID VAC PFIZER DIL RECON 30 MCG/0.3 ML IM: CPT

## 2021-02-11 ENCOUNTER — IMMUNIZATION (OUTPATIENT)
Dept: NURSING | Facility: CLINIC | Age: 56
End: 2021-02-11
Attending: FAMILY MEDICINE
Payer: COMMERCIAL

## 2021-02-11 PROCEDURE — 0002A PR COVID VAC PFIZER DIL RECON 30 MCG/0.3 ML IM: CPT

## 2021-02-11 PROCEDURE — 91300 PR COVID VAC PFIZER DIL RECON 30 MCG/0.3 ML IM: CPT

## 2021-05-17 VITALS
WEIGHT: 260 LBS | HEIGHT: 67 IN | OXYGEN SATURATION: 95 % | TEMPERATURE: 97.2 F | SYSTOLIC BLOOD PRESSURE: 131 MMHG | RESPIRATION RATE: 18 BRPM | DIASTOLIC BLOOD PRESSURE: 83 MMHG | HEART RATE: 100 BPM | BODY MASS INDEX: 40.81 KG/M2

## 2021-05-17 ASSESSMENT — MIFFLIN-ST. JEOR: SCORE: 1791.98

## 2021-05-18 ENCOUNTER — HOSPITAL ENCOUNTER (EMERGENCY)
Facility: CLINIC | Age: 56
Discharge: LEFT BEFORE DISCHARGE | End: 2021-05-18
Payer: COMMERCIAL

## 2021-05-18 ENCOUNTER — OFFICE VISIT (OUTPATIENT)
Dept: FAMILY MEDICINE | Facility: CLINIC | Age: 56
End: 2021-05-18

## 2021-05-18 DIAGNOSIS — S01.81XA FACIAL LACERATION, INITIAL ENCOUNTER: Primary | ICD-10-CM

## 2021-05-18 DIAGNOSIS — Z23 NEED FOR TETANUS BOOSTER: ICD-10-CM
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
## 2021-05-18 DIAGNOSIS — S01.85XA DOG BITE OF FACE, INITIAL ENCOUNTER: ICD-10-CM

## 2021-05-18 PROCEDURE — 90715 TDAP VACCINE 7 YRS/> IM: CPT | Performed by: FAMILY MEDICINE

## 2021-05-18 PROCEDURE — 99213 OFFICE O/P EST LOW 20 MIN: CPT | Mod: 25 | Performed by: FAMILY MEDICINE

## 2021-05-18 PROCEDURE — 90471 IMMUNIZATION ADMIN: CPT | Performed by: FAMILY MEDICINE

## 2021-05-18 NOTE — ED TRIAGE NOTES
Here for dog bite to left side of face and lip. Patient owns the dog and shots have been up to date. ABCs intact.

## 2021-05-18 NOTE — PROGRESS NOTES
Ivonne Melendez is a 56 year old patient who presents to clinic for evaluation.  Was accidentally scratched or bit by her dog last night.  Dog is very food protective.  Dog is up to date on vaccinations, including rabies.  Area is swollen and tender but bleeding has stopped.  She did clean it well last night.  Went to ED this morning but the wait was too long.  No numbness or tingling.        Review of Systems   Constitutional, HEENT, cardiovascular, pulmonary, gi and gu systems are negative, except as otherwise noted.      Objective    There were no vitals taken for this visit.    General: Well appearing, NAD  HEENT: there is a 2 cm linear, swollen laceration along left face lateral to mouth that is well approximated with no active bleeding.  There are other small superficial injuries.  Sensation and motor function is intact.  Psych: normal mood and affect      Assessment & Plan     Facial laceration, initial encounter  After thoroughly cleaning with water and hibiclens the wound was evaluated.  It is well approximated without gapping.  We discussed both the potential benefits and harms of primary repair vs healing by secondary intention.  Ultimately given potential for infection, but more importantly the well approximated wound edges feel suture repair is unlikely to provide additional benefit.  She is in agreement.  The wound was dressed with vaseline and approximated with steri strips and dressed with bandage.  Monitor daily.  Prophylactic abx recommended due to location.  Tetanus was up to date but she thought it had lapsed and so did received a tdap.    - amoxicillin-clavulanate (AUGMENTIN) 875-125 MG tablet; Take 1 tablet by mouth 2 times daily for 7 days    Dog bite of face, initial encounter  - amoxicillin-clavulanate (AUGMENTIN) 875-125 MG tablet; Take 1 tablet by mouth 2 times daily for 7 days  - TDAP VACCINE (Adacel, Boostrix)  [[id number]]    Need for tetanus booster    Patient is agreement with the  assessment and plan as outlined above.  All questions answered.  Red flag symptoms that should prompt emergent evaluation discussed and understood.    Teo Cordero MD  [facility]

## 2021-06-28 ENCOUNTER — OFFICE VISIT (OUTPATIENT)
Dept: FAMILY MEDICINE | Facility: CLINIC | Age: 56
End: 2021-06-28

## 2021-06-28 VITALS — OXYGEN SATURATION: 98 % | DIASTOLIC BLOOD PRESSURE: 78 MMHG | HEART RATE: 88 BPM | SYSTOLIC BLOOD PRESSURE: 137 MMHG

## 2021-06-28 DIAGNOSIS — M54.6 ACUTE RIGHT-SIDED THORACIC BACK PAIN: Primary | ICD-10-CM

## 2021-06-28 PROCEDURE — 71046 X-RAY EXAM CHEST 2 VIEWS: CPT | Performed by: FAMILY MEDICINE

## 2021-06-28 PROCEDURE — 93000 ELECTROCARDIOGRAM COMPLETE: CPT | Mod: 59 | Performed by: FAMILY MEDICINE

## 2021-06-28 PROCEDURE — 99214 OFFICE O/P EST MOD 30 MIN: CPT | Performed by: FAMILY MEDICINE

## 2021-06-28 RX ORDER — BUSPIRONE HYDROCHLORIDE 10 MG/1
10 TABLET ORAL
COMMUNITY
Start: 2021-01-05 | End: 2021-08-26

## 2021-06-28 RX ORDER — PAROXETINE 30 MG/1
30 TABLET, FILM COATED ORAL
COMMUNITY
Start: 2021-01-05 | End: 2021-08-26

## 2021-06-28 RX ORDER — MAGNESIUM GLYCINATE 100 MG
TABLET ORAL
COMMUNITY
End: 2022-06-28

## 2021-06-28 NOTE — PROGRESS NOTES
Ivonne Melendez is a 56 year old patient who presents to clinic for evaluation.  She reports right sided thoracic back pain for 4-5 days.  Cannot recall any injury or trauma.  She denies radiating pain, exacerbating or alleviating factors.  She does feel it is slightly worse when bending forward but does not feel it is reproducible.  No SOB, anterior chest pain or pleuritic component, orthopnea, PND.  She is a former smoker.  No recent wheezing, cough or other respiratory symptoms.  She was on a flight recently but denies calf pain or leg swelling or personal hx of DVT/PE.      No other new concerns.    Review of Systems   Constitutional, HEENT, cardiovascular, pulmonary, GI, , musculoskeletal, neuro, skin, endocrine and psych systems are negative, except as otherwise noted.      Objective    /78   Pulse 88   SpO2 98%     General: Well appearing, NAD  Heart: RRR, no murmur  Chest: Lungs CTAB  MSK: no obvious reproducibility of pain over right thoracic back in area of reported pain  Skin: Clear  Psych: normal mood and affect        CXR - Reviewed and interpreted by me possible area of haziness RLL.  Await formal read  EKG - Reviewed and interpreted by me: NSR, normal axis, poor R wave progression.  Essentially unchanged from previous with exception of deeper S in lead V5    Assessment & Plan     Acute right-sided thoracic back pain  Uncertain cause.  Await formal CXR read.  EKG does not appear significantly changed and there are no exertional symptoms.  Await d-dimer.  Close clinical follow up  Patient is agreement with the assessment and plan as outlined above.  All questions answered.  Red flag symptoms that should prompt emergent evaluation discussed and understood.  - EKG 12-lead complete w/read - Clinics  - D-Dimer ([facility])  - X-ray Chest 2 vws*    Teo Cordero MD  [facility]

## 2021-06-29 LAB — D DIMER MG/L FEU: 0.47 MG/L FEU (ref 0–0.49)

## 2021-07-09 DIAGNOSIS — Z12.31 VISIT FOR SCREENING MAMMOGRAM: ICD-10-CM

## 2021-07-09 PROCEDURE — 77063 BREAST TOMOSYNTHESIS BI: CPT | Mod: TC | Performed by: RADIOLOGY

## 2021-07-09 PROCEDURE — 77067 SCR MAMMO BI INCL CAD: CPT | Mod: TC | Performed by: RADIOLOGY

## 2021-07-15 ENCOUNTER — TELEPHONE (OUTPATIENT)
Dept: FAMILY MEDICINE | Facility: CLINIC | Age: 56
End: 2021-07-15

## 2021-07-15 NOTE — TELEPHONE ENCOUNTER
Patient called stating that the mammogram that was completed on 7/9/21 was not received by th ordering provider Dr. Cordero who works out of the [facility].     Patient requests that the Mammogram be sent to him ASAP.     Transferred patient to medical records.    Yasemin Rich RN on 7/15/2021 at 8:46 AM

## 2021-08-23 ASSESSMENT — PATIENT HEALTH QUESTIONNAIRE - PHQ9
SUM OF ALL RESPONSES TO PHQ QUESTIONS 1-9: 10
10. IF YOU CHECKED OFF ANY PROBLEMS, HOW DIFFICULT HAVE THESE PROBLEMS MADE IT FOR YOU TO DO YOUR WORK, TAKE CARE OF THINGS AT HOME, OR GET ALONG WITH OTHER PEOPLE: SOMEWHAT DIFFICULT
SUM OF ALL RESPONSES TO PHQ QUESTIONS 1-9: 10

## 2021-08-24 ASSESSMENT — PATIENT HEALTH QUESTIONNAIRE - PHQ9: SUM OF ALL RESPONSES TO PHQ QUESTIONS 1-9: 10

## 2021-08-26 ENCOUNTER — OFFICE VISIT (OUTPATIENT)
Dept: PEDIATRICS | Facility: CLINIC | Age: 56
End: 2021-08-26
Payer: COMMERCIAL

## 2021-08-26 VITALS
HEIGHT: 67 IN | TEMPERATURE: 98.6 F | BODY MASS INDEX: 40.72 KG/M2 | DIASTOLIC BLOOD PRESSURE: 80 MMHG | HEART RATE: 88 BPM | SYSTOLIC BLOOD PRESSURE: 136 MMHG

## 2021-08-26 DIAGNOSIS — F51.01 PRIMARY INSOMNIA: ICD-10-CM

## 2021-08-26 DIAGNOSIS — N95.1 SYMPTOMATIC MENOPAUSAL OR FEMALE CLIMACTERIC STATES: ICD-10-CM

## 2021-08-26 DIAGNOSIS — E66.813 CLASS 3 SEVERE OBESITY DUE TO EXCESS CALORIES WITHOUT SERIOUS COMORBIDITY WITH BODY MASS INDEX (BMI) OF 40.0 TO 44.9 IN ADULT (H): ICD-10-CM

## 2021-08-26 DIAGNOSIS — F41.1 GAD (GENERALIZED ANXIETY DISORDER): ICD-10-CM

## 2021-08-26 DIAGNOSIS — E66.01 CLASS 3 SEVERE OBESITY DUE TO EXCESS CALORIES WITHOUT SERIOUS COMORBIDITY WITH BODY MASS INDEX (BMI) OF 40.0 TO 44.9 IN ADULT (H): ICD-10-CM

## 2021-08-26 DIAGNOSIS — M54.50 CHRONIC LOW BACK PAIN, UNSPECIFIED BACK PAIN LATERALITY, UNSPECIFIED WHETHER SCIATICA PRESENT: ICD-10-CM

## 2021-08-26 DIAGNOSIS — G89.29 CHRONIC LOW BACK PAIN, UNSPECIFIED BACK PAIN LATERALITY, UNSPECIFIED WHETHER SCIATICA PRESENT: ICD-10-CM

## 2021-08-26 DIAGNOSIS — Z76.89 ENCOUNTER TO ESTABLISH CARE: Primary | ICD-10-CM

## 2021-08-26 PROBLEM — F41.9 ANXIETY: Status: ACTIVE | Noted: 2021-08-26

## 2021-08-26 PROCEDURE — 99204 OFFICE O/P NEW MOD 45 MIN: CPT | Performed by: NURSE PRACTITIONER

## 2021-08-26 RX ORDER — BUSPIRONE HYDROCHLORIDE 10 MG/1
10 TABLET ORAL 2 TIMES DAILY
Qty: 180 TABLET | Refills: 3 | Status: SHIPPED | OUTPATIENT
Start: 2021-08-26 | End: 2022-09-12

## 2021-08-26 RX ORDER — PAROXETINE 30 MG/1
30 TABLET, FILM COATED ORAL EVERY MORNING
Qty: 60 TABLET | Refills: 0 | Status: SHIPPED | OUTPATIENT
Start: 2021-08-26 | End: 2021-12-14

## 2021-08-26 NOTE — PROGRESS NOTES
Assessment & Plan     Encounter to establish care  Histories and medications reviewed and updated.     HAYES (generalized anxiety disorder)  Stable. Currently taking paxil 30 mg daily and buspirone 10 mg BID. Anxiety has been much improved since addition of buspirone. Is interested in possible tapering off paxil, doesn't feel it is doing anything. Referral to MTM to determine a plan for tapering in order to avoid withdrawal symptoms. Refilled both medications for now.   - PARoxetine (PAXIL) 30 MG tablet; Take 1 tablet (30 mg) by mouth every morning  - busPIRone (BUSPAR) 10 MG tablet; Take 1 tablet (10 mg) by mouth 2 times daily  - Med Therapy Management Referral    Symptomatic menopausal or female climacteric states  Was on HRT previously (started August 2020), but started having uterine bleeding so HRT was stopped early September 2020. A day after she stopped the HRT she began having heavy bleeding and cramps, pain and bloating and continued to bleed for >5 days. Had a D&C in 2017 and disordered proliferative endometrium was found. Saw OB/GYN 9/10/2020, at which time US was recommended to evaluate endometrial thickness. Heavy bleeding though to be 2/2 to withdrawal from HRT. No issues since but is hesitant to try HRT again, especially because she has a good friend who was on it and got breast cancer.    Class 3 severe obesity due to excess calories without serious comorbidity with body mass index (BMI) of 40.0 to 44.9 in adult (H)  Body mass index is 40.72 kg/m . Has been gaining weight every year, which she attributes in part to menopause, which has been tough for her. Currently pretty inactive.  - Lipid panel reflex to direct LDL Fasting; Future  - Hemoglobin A1c; Future  - Comprehensive metabolic panel (BMP + Alb, Alk Phos, ALT, AST, Total. Bili, TP); Future    Primary insomnia  Difficulty falling and staying asleep, has had problems since she was a little girl but problems sleeping have gotten worse since  "menopause. Has tried trazodone, melatonin, benadryl, meditation. Discussed sleep psychology, which patient is open to and will continue to think about. Referral prn.    Chronic low back pain, unspecified back pain laterality, unspecified whether sciatica present  History of DDD and lumbar disc herniation attributed to playing hockey. Has undergone disc decompression therapy, which was very helpful. Had been in PT for awhile but stopped about 3 months ago due to schedule changes at work.      46 minutes spent on the date of the encounter doing patient visit and documentation        BMI:   Estimated body mass index is 40.72 kg/m  as calculated from the following:    Height as of this encounter: 1.702 m (5' 7\").    Weight as of 5/17/21: 117.9 kg (260 lb).   Weight management plan: Discussed healthy diet and exercise guidelines      MEDICATIONS:  Continue current medications without change  CONSULTATION/REFERRAL to ABHINAV Courtney CNP  [facility] PRANEETH Melendez is a 56 year old who presents for the following health issues:      Medication Followup of Paxil    Taking Medication as prescribed: yes    Side Effects:  None    Medication Helping Symptoms:  No     #Obesity  Has been gaining weight every year, which she attributes in part to menopause, which has been tough for her.   Currently pretty inactive.     #Anxiety  Much improved since addition of buspirone BID, has helped anxiety incredibly. Continues on Paxil 30 mg daily, but doesn't feel it really does anything. Interested in tapering off paxil. Feels anxiety is under good control at this time. Has done well with group counseling, less so with individual counseling.     History of ADHD, on meds as a child but nothing as an adult.     PHQ 8/23/2021   PHQ-9 Total Score 10   Q9: Thoughts of better off dead/self-harm past 2 weeks Not at all     #Insomnia  Difficulty falling and staying asleep, has had problems since she was a " little girl but problems sleeping have gotten worse since menopause. Has tried trazodone, melatonin, benadryl, meditation.     #Back pain  History of DDD and lumbar disc herniation attributed to playing hockey. Has undergone disc decompression therapy, which was very helpful. Had been in PT for awhile but stopped about 3 months ago due to schedule changes at work.    #Symptomatic menopause  Was on HRT previously, but started having periods. Saw OB/GYN, was told it was nothing to be concerned about but that she may continue to get periods if she continues HRT so she stopped.     Colonoscopy done in , was told to repeat in 10 years.    Adopted, found biological family a few years ago.     Past Medical History:   Diagnosis Date     Fracture of right shoulder 2018     Fracture of right wrist      HAYES (generalized anxiety disorder)      Moderate episode of recurrent major depressive disorder (H)      Past Surgical History:   Procedure Laterality Date     DILATION AND CURETTAGE  2016     THYROIDECTOMY   2016    Partial, doesn't require meds     Family History   Problem Relation Age of Onset     Heart Disease Mother      Other - See Comments Mother         Lumpectomy     Heart Disease Father      Heart Surgery Father         Triple     No Known Problems Sister      No Known Problems Sister      No Known Problems Sister      No Known Problems Sister      Social History     Socioeconomic History     Marital status:      Spouse name: Not on file     Number of children: Not on file     Years of education: Not on file     Highest education level: Not on file   Occupational History     Not on file   Tobacco Use     Smoking status: Former Smoker     Types: Cigarettes     Quit date:      Years since quittin.6     Smokeless tobacco: Never Used     Tobacco comment: Social smoker only   Substance and Sexual Activity     Alcohol use: Yes     Alcohol/week: 7.0 standard drinks     Types: 7 Glasses of wine per week  "    Drug use: No     Sexual activity: Yes     Partners: Male   Other Topics Concern     Not on file   Social History Narrative    Moved to [place] 3 years ago, had been living in [facility] [facility] [place] prior, is from [place].     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         Hannah Espinoza, DNP, APRN, CNP    08/26/21     Social Determinants of Health     Financial Resource Strain:      Difficulty of Paying Living Expenses:    Food Insecurity:      Worried About Running Out of Food in the Last Year:      Ran Out of Food in the Last Year:    Transportation Needs:      Lack of Transportation (Medical):      Lack of Transportation (Non-Medical):    Physical Activity:      Days of Exercise per Week:      Minutes of Exercise per Session:    Stress:      Feeling of Stress :    Social Connections:      Frequency of Communication with Friends and Family:      Frequency of Social Gatherings with Friends and Family:      Attends Congregational Services:      Active Member of Clubs or Organizations:      Attends Club or Organization Meetings:      Marital Status:    Intimate Partner Violence:      Fear of Current or Ex-Partner:      Emotionally Abused:      Physically Abused:      Sexually Abused:      Current Outpatient Medications   Medication     busPIRone (BUSPAR) 10 MG tablet     PARoxetine (PAXIL) 30 MG tablet     Magnesium Bisglycinate (MAG GLYCINATE) 100 MG TABS     vitamin D3 (CHOLECALCIFEROL) 125 MCG (5000 UT) tablet     No current facility-administered medications for this visit.        Allergies   Allergen Reactions     Mold Cough and Other (See Comments)     Monosodium Glutamate GI Disturbance, Headache and Nausea and Vomiting     Pollen Extract Cough and Other (See Comments)     Seasonal allergies     Bupropion Anxiety         Review of Systems    ROS: 10 point ROS neg other than the symptoms noted above in the HPI.        Objective    /80   Pulse 88   Temp 98.6  F (37  C) (Oral)   Ht 1.702 m (5' 7\")   BMI 40.72 kg/m    Body mass index " is 40.72 kg/m .  Physical Exam  Constitutional:       General: She is not in acute distress.     Appearance: Normal appearance. She is not ill-appearing or toxic-appearing.   Cardiovascular:      Rate and Rhythm: Normal rate.   Pulmonary:      Effort: Pulmonary effort is normal. No respiratory distress.   Neurological:      General: No focal deficit present.      Mental Status: She is alert and oriented to person, place, and time.   Psychiatric:         Mood and Affect: Mood normal.         Behavior: Behavior normal.

## 2021-08-30 ENCOUNTER — TELEPHONE (OUTPATIENT)
Dept: PEDIATRICS | Facility: CLINIC | Age: 56
End: 2021-08-30

## 2021-08-30 NOTE — TELEPHONE ENCOUNTER
MTM referral from: [facility] visit (referral by provider)    MTM referral outreach attempt #2 on August 30, 2021 at 4:07 PM      Outcome: Patient not reachable after several attempts, will route to MTM Pharmacist/Provider as an FYI. Thank you for the referral.    Alfonso Porter, MTM coordinator

## 2021-09-09 ENCOUNTER — OFFICE VISIT (OUTPATIENT)
Dept: URGENT CARE | Facility: URGENT CARE | Age: 56
End: 2021-09-09
Payer: COMMERCIAL

## 2021-09-09 VITALS
OXYGEN SATURATION: 98 % | SYSTOLIC BLOOD PRESSURE: 135 MMHG | DIASTOLIC BLOOD PRESSURE: 83 MMHG | HEART RATE: 78 BPM | RESPIRATION RATE: 20 BRPM | TEMPERATURE: 98 F

## 2021-09-09 DIAGNOSIS — S01.551A DOG BITE OF VERMILION BORDER OF LOWER LIP, INITIAL ENCOUNTER: Primary | ICD-10-CM
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
## 2021-09-09 DIAGNOSIS — S01.551A DOG BITE OF SKIN OF LIP, INITIAL ENCOUNTER: ICD-10-CM

## 2021-09-09 PROCEDURE — 12011 RPR F/E/E/N/L/M 2.5 CM/<: CPT | Performed by: PHYSICIAN ASSISTANT

## 2021-09-09 NOTE — PROGRESS NOTES
Assessment/Plan:    Discussed pros & cons of primary suture repair. Pro being better cosmetic outcome, cons being increased risk of infection with wound being from dog bite as well as > 12 hours. Discussed wound will have significant scarring regardless of if we do suture repair or not as it is significantly swollen and crosses the [place] border. Advised pt may go to plastic surgeon after primary closure/suture removal if unhappy with scar appearance.   Pt elects for suture repair. This was done with loosely approximated sutures. Wound cleaned with copious irrigation. No sign of infection at this time. Augmentin prescribed for infection prophylaxis.  Dog's rabies vaccine status is UTD.    LACERATION REPAIR:   Last tetanus booster within 5 years: yes  Laceration LOCATION: lower lip  Size of laceration: 2 centimeters  Characteristics of the laceration: extends across lip margin, extends into subcutaneous fat and semi-circular  No Foreign body noted.     Oral consent received.  Patient aware of risks which include but are not limited to infection, bleeding, iatrogenic injury. Alternative treatment plan was also discussed.  Copious irrigation with normal saline done. No FBs.  Locally injected with Lidocaine 2% with epinephrine at the wound site ,  good anesthesia achieved.    Laceration was closed using 4 loosely approximated 6-0 Ethilon interrupted sutures.  Patient tolerated procedure well.      Advised to watch for signs or symptoms of infection. If with any concerns of infection advised to come in immediately to be reassessed. Routine wound care discussed.   See patient instructions below.    At the end of the encounter, I discussed results, diagnosis, medications. Discussed red flags for immediate return to clinic/ER, as well as indications for follow up if no improvement. Patient understood and agreed to plan. Patient was stable for discharge.      ICD-10-CM    1. Dog bite of [place]mil[place] border of lower lip,  initial encounter  S01.551A amoxicillin-clavulanate (AUGMENTIN) 875-125 MG tablet    W54.0XXA    2. Dog bite of skin of lip, initial encounter  S01.551A     W54.0XXA          Return in about 3 days (around 9/12/2021) for suture removal.    JOSH Wilburn, ANT  [facility]    ----------------------------------------------------------------------------------------------------------------------------------------------------------------------  HPI:  Nora Og is a 56 year old female who presents for evaluation of dog bite to R upper and lower lip onset 2200 last night. The wound on the lower lip is more swollen today, has been intermittently bleeding still. She was accidentally bit by her dog, who is very food protective. Her dog is UTD on rabies vaccination. Symptoms are moderate in severity & constant in duration. Patiens reports no fever/chills, purulent drainage, warmth, erythema, motor deficit, numbness/tingling, or any other symptoms. Tetanus is up to date (within last 5 years).    Past Medical History:   Diagnosis Date     Fracture of right shoulder 2018     Fracture of right wrist      HAYES (generalized anxiety disorder)      Moderate episode of recurrent major depressive disorder (H)        Vitals:    09/09/21 1014   BP: 135/83   Pulse: 78   Resp: 20   Temp: 98  F (36.7  C)   TempSrc: Tympanic   SpO2: 98%       Physical Exam  Vitals and nursing note reviewed.   HENT:      Mouth/Throat:      Mouth: Lacerations present.     Pulmonary:      Effort: Pulmonary effort is normal.   Neurological:      Mental Status: She is alert.         Labs/Imaging:  No results found for this or any previous visit (from the past 24 hour(s)).  Diagnostic Test Results (Optional):[id number]}    Patient Instructions     1. Following the suture care instructions below.   2. Return to have your sutures removed. Timing is based on location.  ?Eyelids  & Lips- Three days  ?Face - Five days  ?Scalp - Seven  days  ?Everywhere else- Ten days (sometimes fourteen days if over a joint)    Keep sutures clean    Avoid doing things that could cause dirt or sweat to get on your sutures. If needed, cover your sutures with a bandage (dressing) to protect them.    Don t pick at scabs. They help protect the wound.  Keep sutures dry    Keep your sutures out of water.    Take a sponge bath to avoid getting your sutures wound wet for the first 24 hours. Then wash gently and pat thoroughly dry.  Leave the dressing in place until you are told to remove it or change it. Change it only as directed, using clean hands:    After the first 24 hours, change your dressing every 12 hours.    Change your dressing if it gets wet or dirty.  Other tips    To help wounds on an arm or leg heal, use the affected limb as little as possible.    To help reduce swelling and throbbing, raise the area with sutures above your heart.    To help prevent itching, cover sutures with gauze. If sutures itch, try not to scratch them.    For pain relief, try acetaminophen or ibuprofen. Don t use aspirin. It can increase bleeding.  When to seek medical care  Call your healthcare provider if you notice any of the following signs:    Increased soreness, pain, or tenderness after 24 hours    A red streak, increased redness, or puffiness near the wound    White, yellowish, or bad smelling discharge from the wound    Bleeding that can t be stopped by applying pressure    Steri-Strips fall off or stitches dissolve before the wound heals    Fever over 100.4 F (38.0 C)   Date Last Reviewed: 7/1/2016 2000-2016 [employer]. [address] [place] [address], [place] [address]. All rights reserved. This information is not intended as a substitute for professional medical care. Always follow your healthcare professional's instructions.

## 2021-09-09 NOTE — PATIENT INSTRUCTIONS
1. Following the suture care instructions below.   2. Return to have your sutures removed. Timing is based on location.  ?Eyelids  & Lips- Three days  ?Face - Five days  ?Scalp - Seven days  ?Everywhere else- Ten days (sometimes fourteen days if over a joint)    Keep sutures clean    Avoid doing things that could cause dirt or sweat to get on your sutures. If needed, cover your sutures with a bandage (dressing) to protect them.    Don t pick at scabs. They help protect the wound.  Keep sutures dry    Keep your sutures out of water.    Take a sponge bath to avoid getting your sutures wound wet for the first 24 hours. Then wash gently and pat thoroughly dry.  Leave the dressing in place until you are told to remove it or change it. Change it only as directed, using clean hands:    After the first 24 hours, change your dressing every 12 hours.    Change your dressing if it gets wet or dirty.  Other tips    To help wounds on an arm or leg heal, use the affected limb as little as possible.    To help reduce swelling and throbbing, raise the area with sutures above your heart.    To help prevent itching, cover sutures with gauze. If sutures itch, try not to scratch them.    For pain relief, try acetaminophen or ibuprofen. Don t use aspirin. It can increase bleeding.  When to seek medical care  Call your healthcare provider if you notice any of the following signs:    Increased soreness, pain, or tenderness after 24 hours    A red streak, increased redness, or puffiness near the wound    White, yellowish, or bad smelling discharge from the wound    Bleeding that can t be stopped by applying pressure    Steri-Strips fall off or stitches dissolve before the wound heals    Fever over 100.4 F (38.0 C)   Date Last Reviewed: 7/1/2016 2000-2016 [employer]. [address] [place] [address], [place] [address]. All rights reserved. This information is not intended as a substitute for professional medical care. Always follow  your healthcare professional's instructions.

## 2021-09-16 ENCOUNTER — TELEPHONE (OUTPATIENT)
Dept: FAMILY MEDICINE | Facility: CLINIC | Age: 56
End: 2021-09-16

## 2021-09-16 DIAGNOSIS — Z20.822 EXPOSURE TO COVID-19 VIRUS: Primary | ICD-10-CM

## 2021-09-17 ENCOUNTER — LAB (OUTPATIENT)
Dept: URGENT CARE | Facility: URGENT CARE | Age: 56
End: 2021-09-17
Attending: NURSE PRACTITIONER
Payer: COMMERCIAL

## 2021-09-17 DIAGNOSIS — Z20.822 EXPOSURE TO COVID-19 VIRUS: ICD-10-CM

## 2021-09-17 LAB — SARS-COV-2 RNA RESP QL NAA+PROBE: NEGATIVE

## 2021-09-17 PROCEDURE — U0003 INFECTIOUS AGENT DETECTION BY NUCLEIC ACID (DNA OR RNA); SEVERE ACUTE RESPIRATORY SYNDROME CORONAVIRUS 2 (SARS-COV-2) (CORONAVIRUS DISEASE [COVID-19]), AMPLIFIED PROBE TECHNIQUE, MAKING USE OF HIGH THROUGHPUT TECHNOLOGIES AS DESCRIBED BY CMS-2020-01-R: HCPCS

## 2021-09-17 PROCEDURE — U0005 INFEC AGEN DETEC AMPLI PROBE: HCPCS

## 2021-10-10 ENCOUNTER — HEALTH MAINTENANCE LETTER (OUTPATIENT)
Age: 56
End: 2021-10-10

## 2021-10-25 DIAGNOSIS — Z23 NEED FOR INFLUENZA VACCINATION: Primary | ICD-10-CM

## 2021-10-25 PROCEDURE — 90471 IMMUNIZATION ADMIN: CPT | Performed by: FAMILY MEDICINE

## 2021-10-25 PROCEDURE — 90686 IIV4 VACC NO PRSV 0.5 ML IM: CPT | Performed by: FAMILY MEDICINE

## 2021-12-14 ENCOUNTER — MYC REFILL (OUTPATIENT)
Dept: PEDIATRICS | Facility: CLINIC | Age: 56
End: 2021-12-14
Payer: COMMERCIAL

## 2021-12-14 DIAGNOSIS — F41.1 GAD (GENERALIZED ANXIETY DISORDER): ICD-10-CM

## 2021-12-16 DIAGNOSIS — J10.1 INFLUENZA A: Primary | ICD-10-CM

## 2021-12-16 RX ORDER — PAROXETINE 30 MG/1
30 TABLET, FILM COATED ORAL EVERY MORNING
Qty: 60 TABLET | Refills: 0 | Status: SHIPPED | OUTPATIENT
Start: 2021-12-16 | End: 2022-03-04

## 2021-12-16 RX ORDER — OSELTAMIVIR PHOSPHATE 75 MG/1
75 CAPSULE ORAL 2 TIMES DAILY
Qty: 10 CAPSULE | Refills: 0 | Status: SHIPPED | OUTPATIENT
Start: 2021-12-16 | End: 2022-03-09

## 2021-12-16 RX ORDER — OSELTAMIVIR PHOSPHATE 75 MG/1
75 CAPSULE ORAL 2 TIMES DAILY
Qty: 10 CAPSULE | Refills: 0 | Status: SHIPPED | OUTPATIENT
Start: 2021-12-16 | End: 2021-12-16

## 2022-01-13 DIAGNOSIS — E66.01 CLASS 3 SEVERE OBESITY DUE TO EXCESS CALORIES WITHOUT SERIOUS COMORBIDITY WITH BODY MASS INDEX (BMI) OF 40.0 TO 44.9 IN ADULT (H): ICD-10-CM

## 2022-01-13 DIAGNOSIS — E66.813 CLASS 3 SEVERE OBESITY DUE TO EXCESS CALORIES WITHOUT SERIOUS COMORBIDITY WITH BODY MASS INDEX (BMI) OF 40.0 TO 44.9 IN ADULT (H): ICD-10-CM

## 2022-01-13 DIAGNOSIS — Z86.39 HISTORY OF THYROID CYST: Primary | ICD-10-CM

## 2022-01-13 PROCEDURE — 36415 COLL VENOUS BLD VENIPUNCTURE: CPT | Performed by: NURSE PRACTITIONER

## 2022-01-13 SDOH — ECONOMIC STABILITY: TRANSPORTATION INSECURITY
IN THE PAST 12 MONTHS, HAS LACK OF TRANSPORTATION KEPT YOU FROM MEETINGS, WORK, OR FROM GETTING THINGS NEEDED FOR DAILY LIVING?: NO

## 2022-01-13 SDOH — ECONOMIC STABILITY: TRANSPORTATION INSECURITY
IN THE PAST 12 MONTHS, HAS THE LACK OF TRANSPORTATION KEPT YOU FROM MEDICAL APPOINTMENTS OR FROM GETTING MEDICATIONS?: NO

## 2022-01-13 SDOH — ECONOMIC STABILITY: INCOME INSECURITY: IN THE LAST 12 MONTHS, WAS THERE A TIME WHEN YOU WERE NOT ABLE TO PAY THE MORTGAGE OR RENT ON TIME?: NO

## 2022-01-13 SDOH — ECONOMIC STABILITY: FOOD INSECURITY: WITHIN THE PAST 12 MONTHS, THE FOOD YOU BOUGHT JUST DIDN'T LAST AND YOU DIDN'T HAVE MONEY TO GET MORE.: NEVER TRUE

## 2022-01-13 SDOH — ECONOMIC STABILITY: INCOME INSECURITY: HOW HARD IS IT FOR YOU TO PAY FOR THE VERY BASICS LIKE FOOD, HOUSING, MEDICAL CARE, AND HEATING?: NOT HARD AT ALL

## 2022-01-13 SDOH — HEALTH STABILITY: PHYSICAL HEALTH: ON AVERAGE, HOW MANY DAYS PER WEEK DO YOU ENGAGE IN MODERATE TO STRENUOUS EXERCISE (LIKE A BRISK WALK)?: 3 DAYS

## 2022-01-13 SDOH — HEALTH STABILITY: PHYSICAL HEALTH: ON AVERAGE, HOW MANY MINUTES DO YOU ENGAGE IN EXERCISE AT THIS LEVEL?: 30 MIN

## 2022-01-13 SDOH — ECONOMIC STABILITY: FOOD INSECURITY: WITHIN THE PAST 12 MONTHS, YOU WORRIED THAT YOUR FOOD WOULD RUN OUT BEFORE YOU GOT MONEY TO BUY MORE.: NEVER TRUE

## 2022-01-13 ASSESSMENT — LIFESTYLE VARIABLES
HOW MANY STANDARD DRINKS CONTAINING ALCOHOL DO YOU HAVE ON A TYPICAL DAY: 1 OR 2
HOW OFTEN DO YOU HAVE A DRINK CONTAINING ALCOHOL: 2-4 TIMES A MONTH
HOW OFTEN DO YOU HAVE SIX OR MORE DRINKS ON ONE OCCASION: NEVER

## 2022-01-13 ASSESSMENT — SOCIAL DETERMINANTS OF HEALTH (SDOH)
HOW OFTEN DO YOU ATTEND CHURCH OR RELIGIOUS SERVICES?: 1 TO 4 TIMES PER YEAR
IN A TYPICAL WEEK, HOW MANY TIMES DO YOU TALK ON THE PHONE WITH FAMILY, FRIENDS, OR NEIGHBORS?: MORE THAN THREE TIMES A WEEK
DO YOU BELONG TO ANY CLUBS OR ORGANIZATIONS SUCH AS CHURCH GROUPS UNIONS, FRATERNAL OR ATHLETIC GROUPS, OR SCHOOL GROUPS?: YES
HOW OFTEN DO YOU GET TOGETHER WITH FRIENDS OR RELATIVES?: ONCE A WEEK

## 2022-01-14 ENCOUNTER — OFFICE VISIT (OUTPATIENT)
Dept: PEDIATRICS | Facility: CLINIC | Age: 57
End: 2022-01-14
Payer: COMMERCIAL

## 2022-01-14 ENCOUNTER — PATIENT OUTREACH (OUTPATIENT)
Dept: ONCOLOGY | Facility: CLINIC | Age: 57
End: 2022-01-14

## 2022-01-14 VITALS
DIASTOLIC BLOOD PRESSURE: 80 MMHG | OXYGEN SATURATION: 97 % | HEART RATE: 89 BPM | RESPIRATION RATE: 16 BRPM | BODY MASS INDEX: 41.75 KG/M2 | SYSTOLIC BLOOD PRESSURE: 126 MMHG | WEIGHT: 266 LBS | TEMPERATURE: 97.6 F | HEIGHT: 67 IN

## 2022-01-14 DIAGNOSIS — E66.01 MORBID OBESITY (H): ICD-10-CM

## 2022-01-14 DIAGNOSIS — N95.1 SYMPTOMATIC MENOPAUSAL OR FEMALE CLIMACTERIC STATES: ICD-10-CM

## 2022-01-14 DIAGNOSIS — Z80.3 FAMILY HISTORY OF MALIGNANT NEOPLASM OF BREAST: ICD-10-CM

## 2022-01-14 DIAGNOSIS — D22.9 MULTIPLE ATYPICAL NEVI: ICD-10-CM

## 2022-01-14 DIAGNOSIS — F41.1 GAD (GENERALIZED ANXIETY DISORDER): ICD-10-CM

## 2022-01-14 DIAGNOSIS — Z00.00 ROUTINE GENERAL MEDICAL EXAMINATION AT A HEALTH CARE FACILITY: Primary | ICD-10-CM

## 2022-01-14 PROCEDURE — 90471 IMMUNIZATION ADMIN: CPT | Performed by: NURSE PRACTITIONER

## 2022-01-14 PROCEDURE — 90750 HZV VACC RECOMBINANT IM: CPT | Performed by: NURSE PRACTITIONER

## 2022-01-14 PROCEDURE — 99396 PREV VISIT EST AGE 40-64: CPT | Mod: 25 | Performed by: NURSE PRACTITIONER

## 2022-01-14 SDOH — HEALTH STABILITY: PHYSICAL HEALTH: ON AVERAGE, HOW MANY DAYS PER WEEK DO YOU ENGAGE IN MODERATE TO STRENUOUS EXERCISE (LIKE A BRISK WALK)?: 3 DAYS

## 2022-01-14 SDOH — HEALTH STABILITY: PHYSICAL HEALTH: ON AVERAGE, HOW MANY MINUTES DO YOU ENGAGE IN EXERCISE AT THIS LEVEL?: 30 MIN

## 2022-01-14 SDOH — ECONOMIC STABILITY: FOOD INSECURITY: WITHIN THE PAST 12 MONTHS, THE FOOD YOU BOUGHT JUST DIDN'T LAST AND YOU DIDN'T HAVE MONEY TO GET MORE.: NEVER TRUE

## 2022-01-14 SDOH — ECONOMIC STABILITY: INCOME INSECURITY: HOW HARD IS IT FOR YOU TO PAY FOR THE VERY BASICS LIKE FOOD, HOUSING, MEDICAL CARE, AND HEATING?: NOT HARD AT ALL

## 2022-01-14 SDOH — ECONOMIC STABILITY: INCOME INSECURITY: IN THE LAST 12 MONTHS, WAS THERE A TIME WHEN YOU WERE NOT ABLE TO PAY THE MORTGAGE OR RENT ON TIME?: NO

## 2022-01-14 SDOH — ECONOMIC STABILITY: FOOD INSECURITY: WITHIN THE PAST 12 MONTHS, YOU WORRIED THAT YOUR FOOD WOULD RUN OUT BEFORE YOU GOT MONEY TO BUY MORE.: NEVER TRUE

## 2022-01-14 ASSESSMENT — ENCOUNTER SYMPTOMS
SHORTNESS OF BREATH: 0
ARTHRALGIAS: 1
HEMATURIA: 0
HEADACHES: 0
DIARRHEA: 0
HEMATOCHEZIA: 0
CONSTIPATION: 1
JOINT SWELLING: 0
PARESTHESIAS: 0
SORE THROAT: 0
COUGH: 0
ABDOMINAL PAIN: 0
HEARTBURN: 1
EYE PAIN: 0
NERVOUS/ANXIOUS: 1
WEAKNESS: 0
BREAST MASS: 0
FEVER: 0
MYALGIAS: 1
DYSURIA: 0
FREQUENCY: 0
NAUSEA: 0
DIZZINESS: 0
CHILLS: 0
PALPITATIONS: 0

## 2022-01-14 ASSESSMENT — LIFESTYLE VARIABLES
HOW OFTEN DO YOU HAVE A DRINK CONTAINING ALCOHOL: 2-4 TIMES A MONTH
HOW OFTEN DO YOU HAVE SIX OR MORE DRINKS ON ONE OCCASION: NEVER
HOW MANY STANDARD DRINKS CONTAINING ALCOHOL DO YOU HAVE ON A TYPICAL DAY: 1 OR 2

## 2022-01-14 ASSESSMENT — SOCIAL DETERMINANTS OF HEALTH (SDOH)
DO YOU BELONG TO ANY CLUBS OR ORGANIZATIONS SUCH AS CHURCH GROUPS UNIONS, FRATERNAL OR ATHLETIC GROUPS, OR SCHOOL GROUPS?: YES
IN A TYPICAL WEEK, HOW MANY TIMES DO YOU TALK ON THE PHONE WITH FAMILY, FRIENDS, OR NEIGHBORS?: MORE THAN THREE TIMES A WEEK
HOW OFTEN DO YOU ATTEND CHURCH OR RELIGIOUS SERVICES?: 1 TO 4 TIMES PER YEAR
HOW OFTEN DO YOU GET TOGETHER WITH FRIENDS OR RELATIVES?: ONCE A WEEK

## 2022-01-14 ASSESSMENT — MIFFLIN-ST. JEOR: SCORE: 1819.2

## 2022-01-14 NOTE — PROGRESS NOTES
SUBJECTIVE:   CC: Nora Og is an 56 year old woman who presents for preventive health visit.     Patient has been advised of split billing requirements and indicates understanding: Yes     Healthy Habits:     Getting at least 3 servings of Calcium per day:  Yes    Bi-annual eye exam:  Yes    Dental care twice a year:  Yes    Sleep apnea or symptoms of sleep apnea:  None    Diet:  Gluten-free/reduced    Frequency of exercise:  2-3 days/week    Taking medications regularly:  Yes    Medication side effects:  None    PHQ-2 Total Score: 0    Additional concerns today:  Yes      Pap completed in 2018 by Dr. Jessica Crowe at [facility] in [place], results reportedly normal. Has never had an abnormal pap. EDILSON signed in August 2021 but records have not yet been received.     Colonoscopy in 2015 in [place], results reportedly normal, repeat in 2025.     Tore her right meniscus in November 2021 so hasn't been able to perform regular physical exercise. Following at [facility], was told she needed R knee replacement. Scheduled for March 30 but unsure if she can do it secondary to out of pocket cost.     Today's PHQ-2 Score:   PHQ-2 ( 1999 Pfizer) 1/14/2022   Q1: Little interest or pleasure in doing things 0   Q2: Feeling down, depressed or hopeless 0   PHQ-2 Score 0   PHQ-2 Total Score (12-17 Years)- Positive if 3 or more points; Administer PHQ-A if positive -   Q1: Little interest or pleasure in doing things Not at all   Q2: Feeling down, depressed or hopeless Not at all   PHQ-2 Score 0       Abuse: Current or Past (Physical, Sexual or Emotional) - No  Do you feel safe in your environment? Yes    Have you ever done Advance Care Planning? (For example, a Health Directive, POLST, or a discussion with a medical provider or your loved ones about your wishes): No, advance care planning information given to patient to review.  Patient plans to discuss their wishes with loved ones or provider.      Social  History     Tobacco Use     Smoking status: Former Smoker     Types: Cigarettes     Quit date:      Years since quittin.0     Smokeless tobacco: Never Used     Tobacco comment: Social smoker only   Substance Use Topics     Alcohol use: Yes     Alcohol/week: 7.0 standard drinks     Types: 7 Glasses of wine per week         Alcohol Use 2022   Prescreen: >3 drinks/day or >7 drinks/week? Yes   AUDIT SCORE  5       Reviewed orders with patient.  Reviewed health maintenance and updated orders accordingly - Yes  BP Readings from Last 3 Encounters:   22 126/80   21 135/83   21 136/80    Wt Readings from Last 3 Encounters:   22 120.7 kg (266 lb)   21 117.9 kg (260 lb)   19 104.3 kg (230 lb)                    Breast Cancer Screening:    FHS-7:   Breast CA Risk Assessment (FHS-7) 2022   Did any of your first-degree relatives have breast or ovarian cancer? Yes Yes   Did any of your relatives have bilateral breast cancer? No No   Did any man in your family have breast cancer? No No   Did any woman in your family have breast and ovarian cancer? Yes Yes   Did any woman in your family have breast cancer before age 50 y? Yes Yes   Do you have 2 or more relatives with breast and/or ovarian cancer? Yes Yes   Do you have 2 or more relatives with breast and/or bowel cancer? Yes Yes     Mammogram Screening: Recommended mammography every 1-2 years with patient discussion and risk factor consideration  Pertinent mammograms are reviewed under the imaging tab.    History of abnormal Pap smear: NO - age 30-65 PAP every 5 years with negative HPV co-testing recommended     Reviewed and updated as needed this visit by clinical staff  Tobacco  Allergies    Med Hx  Surg Hx  Fam Hx  Soc Hx       Reviewed and updated as needed this visit by Provider               Patient Active Problem List   Diagnosis     Anxiety     Morbid obesity (H)     Past Medical History:   Diagnosis Date      Fracture of right shoulder 2018     Fracture of right wrist      HAYES (generalized anxiety disorder)      Moderate episode of recurrent major depressive disorder (H)      Past Surgical History:   Procedure Laterality Date     DILATION AND CURETTAGE  2016     THYROIDECTOMY   2016    Partial, doesn't require meds     Family History   Problem Relation Age of Onset     Heart Disease Mother      Other - See Comments Mother         Lumpectomy     Heart Disease Father      Heart Surgery Father         Triple     No Known Problems Sister      No Known Problems Sister      No Known Problems Sister      No Known Problems Sister      Social History     Socioeconomic History     Marital status:      Spouse name: Not on file     Number of children: Not on file     Years of education: Not on file     Highest education level: Not on file   Occupational History     Not on file   Tobacco Use     Smoking status: Former Smoker     Types: Cigarettes     Quit date:      Years since quittin.0     Smokeless tobacco: Never Used     Tobacco comment: Social smoker only   Substance and Sexual Activity     Alcohol use: Yes     Alcohol/week: 7.0 standard drinks     Types: 7 Glasses of wine per week     Drug use: No     Sexual activity: Yes     Partners: Male   Other Topics Concern     Not on file   Social History Narrative    Moved to [place] 3 years ago, had been living in [facility] prior, is from [place].     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         Hannah Espinoza, DNP, APRN, CNP    21     Social Determinants of Health     Financial Resource Strain: Low Risk      Difficulty of Paying Living Expenses: Not hard at all   Food Insecurity: No Food Insecurity     Worried About Running Out of Food in the Last Year: Never true     Ran Out of Food in the Last Year: Never true   Transportation Needs: No Transportation Needs     Lack of Transportation (Medical): No     Lack of Transportation (Non-Medical): No   Physical Activity:  Insufficiently Active     Days of Exercise per Week: 3 days     Minutes of Exercise per Session: 30 min   Stress: Stress Concern Present     Feeling of Stress : Rather much   Social Connections: Socially Integrated     Frequency of Communication with Friends and Family: More than three times a week     Frequency of Social Gatherings with Friends and Family: Once a week     Attends Temple Services: 1 to 4 times per year     Active Member of Clubs or Organizations: Yes     Attends Club or Organization Meetings: Not on file     Marital Status:    Intimate Partner Violence: Not on file   Housing Stability: Low Risk      Unable to Pay for Housing in the Last Year: No     Number of Places Lived in the Last Year: 1     Unstable Housing in the Last Year: No     Current Outpatient Medications   Medication     PARoxetine (PAXIL) 30 MG tablet     busPIRone (BUSPAR) 10 MG tablet     Magnesium Bisglycinate (MAG GLYCINATE) 100 MG TABS     oseltamivir (TAMIFLU) 75 MG capsule     vitamin D3 (CHOLECALCIFEROL) 125 MCG (5000 UT) tablet     No current facility-administered medications for this visit.        Allergies   Allergen Reactions     Mold Cough and Other (See Comments)     Monosodium Glutamate GI Disturbance, Headache and Nausea and Vomiting     Pollen Extract Cough and Other (See Comments)     Seasonal allergies     Bupropion Anxiety         Review of Systems   Constitutional: Negative for chills and fever.   HENT: Negative for congestion, ear pain, hearing loss and sore throat.    Eyes: Negative for pain and visual disturbance.   Respiratory: Negative for cough and shortness of breath.    Cardiovascular: Negative for chest pain, palpitations and peripheral edema.   Gastrointestinal: Positive for constipation and heartburn. Negative for abdominal pain, diarrhea, hematochezia and nausea.   Breasts:  Negative for tenderness, breast mass and discharge.   Genitourinary: Negative for dysuria, frequency, genital sores,  "hematuria, pelvic pain, urgency, vaginal bleeding and vaginal discharge.   Musculoskeletal: Positive for arthralgias and myalgias. Negative for joint swelling.   Skin: Negative for rash.   Neurological: Negative for dizziness, weakness, headaches and paresthesias.   Psychiatric/Behavioral: Positive for mood changes. The patient is nervous/anxious.         OBJECTIVE:   /80   Pulse 89   Temp 97.6  F (36.4  C) (Tympanic)   Resp 16   Ht 1.702 m (5' 7\")   Wt 120.7 kg (266 lb)   SpO2 97%   BMI 41.66 kg/m    Physical Exam  Constitutional: appears to be in no acute distress, comfortable, pleasant.   Eyes: anicteric, conjunctiva clear without drainage or erythema. AUSTYN.   Ears, Nose and Throat: tympanic membranes gray with LR,  nose without nasal discharge. OP: no erythema to posterior pharynx, negative post nasal drainage, tonsils +1 no erythema or exudate.  Neck: supple, thyroid palpable,not enlarged, no nodules   Breast: : Exam deferred (deferred after discussion of exam options with patient, no symptoms or concerns).   Cardiovascular: regular rate and rhythm, normal S1 and S2, no murmurs, rubs or gallops, peripheral pulses full and symmetric; negative peripheral edema   Respiratory: Air entry throughout. Breathing pattern unlabored without the use of accessory muscles. Clear to auscultation A and P, no wheezes or crackles, normal breath sounds.    Gastrointestinal: rounded, soft. Positive bowel sounds x4, nontender, no masses.   Genitourinary: : Exam deferred (deferred after discussion of exam options with patient, no symptoms or concerns, pap up to date).   Musculoskeletal: full range of motion, no edema.   Skin: pink, turgor smooth and elastic. Negative for lesions or dryness.  Neurological: normal gait, no tremor.   Psychological: appropriate mood and affect.   Lymphatic: no cervical, axillary, supraclavicular, or infraclavicular lymphadenopathy.    Diagnostic Test Results:  Labs reviewed in " Epic    ASSESSMENT/PLAN:   (Z00.00) Routine general medical examination at a health care facility  (primary encounter diagnosis)  Age appropriate screening and preventative care have been addressed today. Vaccinations have been updated. Patient has been advised to follow a balanced diet. They have been advised to undertake routine aerobic activity and they were counseled on healthy weight maintenance. Colonoscopy has been reviewed and is up to date at this time. Recommend annual vision exams as well as biannual dental exams. They will follow up for annual physical again in one year.   - ZOSTER VACCINE RECOMBINANT ADJUVANTED IM NJX  - Pap utd  - Mammo utd  - Colonoscopy utd            (E66.01) Morbid obesity (H)  Body mass index is 41.66 kg/m . Not able to exercise right now due to right knee injury. Reinforced importance of healthy diet. She is agreeable to weight clinic referral.   -  Comprehensive Weight Management            (Z80.3) Family history of malignant neoplasm of breast  Reports her biological mother had a lumpectomy but the details of pathology are unknown. She eventually passed away at age 84 but cause of death is unknown. Patient is interested in genetic counseling.   - Cancer Risk Mgmt/Cancer Genetic Counseling Referral            (D22.9) Multiple atypical nevi  Recommend annual skin checks with dermatology.  - Adult Dermatology Referral            (F41.1) HAYES (generalized anxiety disorder)  Stable. Reports she did taper off paxil but ended up restarting it due to increased anxiety. Anxiety well controlled on current medication regimen. Given her hot flashes, we discussed venlafaxine which she has tried in the past for anxiety and tolerated it well. Is interested in this transition and I recommended she follow-up with MTM to create a plan for switching medications. She prefers virtual visit due to her work schedule.    (N95.1) Symptomatic menopausal or female climacteric states   Was on HRT  "previously (started August 2020), but started having uterine bleeding so HRT was stopped early September 2020. A day after she stopped the HRT she began having heavy bleeding and cramps, pain and bloating and continued to bleed for >5 days. Had a D&C in 2017 and disordered proliferative endometrium was found. Saw OB/GYN 9/10/2020, at which time US was recommended to evaluate endometrial thickness. Heavy bleeding thought to be 2/2 to withdrawal from HRT. No issues since but is hesitant to try HRT again, especially because she has a good friend who was on it and got breast cancer.        Follow-up: Lab results pending, will follow-up as indicated after reviewing results.       COUNSELING:  Reviewed preventive health counseling, as reflected in patient instructions  Special attention given to:        Regular exercise       Healthy diet/nutrition       Vision screening       Immunizations    Vaccinated for: Zoster         Osteoporosis prevention/bone health       Colon cancer screening    Estimated body mass index is 41.66 kg/m  as calculated from the following:    Height as of this encounter: 1.702 m (5' 7\").    Weight as of this encounter: 120.7 kg (266 lb).    Weight management plan: Patient referred to endocrine and/or weight management specialty    She reports that she quit smoking about 17 years ago. Her smoking use included cigarettes. She has never used smokeless tobacco.      Counseling Resources:  ATP IV Guidelines  Pooled Cohorts Equation Calculator  Breast Cancer Risk Calculator  BRCA-Related Cancer Risk Assessment: FHS-7 Tool  FRAX Risk Assessment  ICSI Preventive Guidelines  Dietary Guidelines for Americans, 2010  USDA's MyPlate  ASA Prophylaxis  Lung CA Screening    ABHINAV Magallanes CNP  [facility]  "

## 2022-01-17 LAB
ALBUMIN SERPL-MCNC: 4.5 G/DL (ref 3.8–4.9)
ALBUMIN/GLOB SERPL: 1.6 {RATIO} (ref 1.2–2.2)
ALP SERPL-CCNC: 85 IU/L (ref 44–121)
ALT SERPL-CCNC: 52 IU/L (ref 0–32)
AST SERPL-CCNC: 27 IU/L (ref 0–40)
BILIRUB SERPL-MCNC: 0.6 MG/DL (ref 0–1.2)
BUN SERPL-MCNC: 12 MG/DL (ref 6–24)
BUN/CREATININE RATIO: 14 (ref 9–23)
CALCIUM SERPL-MCNC: 9.6 MG/DL (ref 8.7–10.2)
CHLORIDE SERPLBLD-SCNC: 100 MMOL/L (ref 96–106)
CHOLEST SERPL-MCNC: 252 MG/DL (ref 100–199)
CREAT SERPL-MCNC: 0.83 MG/DL (ref 0.57–1)
EGFR IF AFRICN AM: 90 ML/MIN/1.73
EGFR IF NONAFRICN AM: 78 ML/MIN/1.73
GLOBULIN, TOTAL: 2.8 G/DL (ref 1.5–4.5)
GLUCOSE SERPL-MCNC: 119 MG/DL (ref 65–99)
HBA1C MFR BLD: 5.7 % (ref 4.8–5.6)
HDLC SERPL-MCNC: 58 MG/DL
LDL/HDL RATIO: 2.8 RATIO (ref 0–3.2)
LDLC SERPL CALC-MCNC: 162 MG/DL (ref 0–99)
NONHDLC SERPL-MCNC: 194 MG/DL (ref 0–129)
POTASSIUM SERPL-SCNC: 4.6 MMOL/L (ref 3.5–5.2)
PROT SERPL-MCNC: 7.3 G/DL (ref 6–8.5)
SODIUM SERPL-SCNC: 141 MMOL/L (ref 134–144)
TOTAL CO2: 27 MMOL/L (ref 20–29)
TRIGL SERPL-MCNC: 177 MG/DL (ref 0–149)
TSH BLD-ACNC: 1.67 UIU/ML (ref 0.45–4.5)

## 2022-01-19 ENCOUNTER — MYC MEDICAL ADVICE (OUTPATIENT)
Dept: PEDIATRICS | Facility: CLINIC | Age: 57
End: 2022-01-19
Payer: COMMERCIAL

## 2022-01-21 NOTE — TELEPHONE ENCOUNTER
The 10-year ASCVD risk score (Vaibhav TIDWELL Jr., et al., 2013) is: 3.1%    Values used to calculate the score:      Age: 56 years      Sex: Female      Is Non- : No      Diabetic: No      Tobacco smoker: No      Systolic Blood Pressure: 126 mmHg      Is BP treated: No      HDL Cholesterol: 58 mg/dL      Total Cholesterol: 252 mg/dL

## 2022-01-25 ENCOUNTER — LAB (OUTPATIENT)
Dept: URGENT CARE | Facility: URGENT CARE | Age: 57
End: 2022-01-25
Attending: NURSE PRACTITIONER
Payer: COMMERCIAL

## 2022-01-25 DIAGNOSIS — Z20.822 EXPOSURE TO COVID-19 VIRUS: ICD-10-CM

## 2022-01-25 DIAGNOSIS — J02.9 PHARYNGITIS, UNSPECIFIED ETIOLOGY: ICD-10-CM

## 2022-01-25 DIAGNOSIS — J34.89 STUFFY AND RUNNY NOSE: ICD-10-CM

## 2022-01-25 DIAGNOSIS — Z20.822 EXPOSURE TO COVID-19 VIRUS: Primary | ICD-10-CM

## 2022-01-25 DIAGNOSIS — R07.0 THROAT PAIN: Primary | ICD-10-CM

## 2022-01-25 PROCEDURE — U0003 INFECTIOUS AGENT DETECTION BY NUCLEIC ACID (DNA OR RNA); SEVERE ACUTE RESPIRATORY SYNDROME CORONAVIRUS 2 (SARS-COV-2) (CORONAVIRUS DISEASE [COVID-19]), AMPLIFIED PROBE TECHNIQUE, MAKING USE OF HIGH THROUGHPUT TECHNOLOGIES AS DESCRIBED BY CMS-2020-01-R: HCPCS

## 2022-01-25 PROCEDURE — U0005 INFEC AGEN DETEC AMPLI PROBE: HCPCS

## 2022-01-26 LAB — SARS-COV-2 RNA RESP QL NAA+PROBE: NEGATIVE

## 2022-03-04 ENCOUNTER — MYC REFILL (OUTPATIENT)
Dept: PEDIATRICS | Facility: CLINIC | Age: 57
End: 2022-03-04
Payer: COMMERCIAL

## 2022-03-04 DIAGNOSIS — F41.1 GAD (GENERALIZED ANXIETY DISORDER): ICD-10-CM

## 2022-03-04 RX ORDER — PAROXETINE 30 MG/1
30 TABLET, FILM COATED ORAL EVERY MORNING
Qty: 90 TABLET | Refills: 2 | Status: SHIPPED | OUTPATIENT
Start: 2022-03-04 | End: 2023-01-09

## 2022-03-04 NOTE — TELEPHONE ENCOUNTER
Prescription approved per [facility] Refill Protocol.    Yasemin Rich RN on 3/4/2022 at 3:50 PM

## 2022-03-09 ENCOUNTER — ALLIED HEALTH/NURSE VISIT (OUTPATIENT)
Dept: SURGERY | Facility: CLINIC | Age: 57
End: 2022-03-09
Payer: COMMERCIAL

## 2022-03-09 ENCOUNTER — OFFICE VISIT (OUTPATIENT)
Dept: SURGERY | Facility: CLINIC | Age: 57
End: 2022-03-09
Payer: COMMERCIAL

## 2022-03-09 VITALS
HEIGHT: 67 IN | WEIGHT: 264.2 LBS | DIASTOLIC BLOOD PRESSURE: 86 MMHG | BODY MASS INDEX: 41.47 KG/M2 | SYSTOLIC BLOOD PRESSURE: 138 MMHG

## 2022-03-09 DIAGNOSIS — R73.03 PREDIABETES: ICD-10-CM

## 2022-03-09 DIAGNOSIS — E66.01 MORBID OBESITY (H): Primary | ICD-10-CM

## 2022-03-09 DIAGNOSIS — Z79.899 MEDICATION MANAGEMENT: ICD-10-CM

## 2022-03-09 DIAGNOSIS — E66.01 MORBID OBESITY (H): ICD-10-CM

## 2022-03-09 DIAGNOSIS — G47.9 SLEEP DISTURBANCE: ICD-10-CM

## 2022-03-09 PROCEDURE — 99204 OFFICE O/P NEW MOD 45 MIN: CPT | Performed by: PHYSICIAN ASSISTANT

## 2022-03-09 PROCEDURE — 97802 MEDICAL NUTRITION INDIV IN: CPT | Performed by: DIETITIAN, REGISTERED

## 2022-03-09 RX ORDER — TOPIRAMATE 25 MG/1
TABLET, FILM COATED ORAL
Qty: 60 TABLET | Refills: 1 | Status: SHIPPED | OUTPATIENT
Start: 2022-03-09 | End: 2022-06-28

## 2022-03-09 NOTE — PATIENT INSTRUCTIONS
It was great meeting you today!  Below is information about the medication we discussed. Please follow up with Lauren Bloch Pharm D in 6 weeks and myself in the beginning of July.  But reach out sooner if you have any questions or concerns at anytime.       Jose M CAIN          MEDICATION STARTED AT THIS APPOINTMENT  We are starting topiramate at bedtime.  Start one tab, 25 mg, for a week. Go up to 50 mg (2 tabs) for the next week.   Stay at 2 tabs until you are seen again. Call the nurse at [phone] if you have any questions or concerns. (Do not stop taking it if you don't think it's working. For some people it works even though they do not feel much different.)    Topiramate (Topamax) is a medication that is used most often to treat migraine headaches or for seizures. It has also been found to help with weight loss. Although it's not currently FDA approved for weight loss, it has been used safely for a number of years to help people who are carrying extra weight.     Just how topiramate helps with weight loss has not been exactly determined. However it seems to work on areas of the brain to quiet down signals related to eating.      Topiramate may make you:    >feel less interest in eating in between meals   >think less about food and eating   >find it easier to push the plate away   >find giving up pop easier    >have an easier time eating less    For some of our patients, the pills work right away. They feel and think quite differently about food. Other patients don't feel much of a change but find in fact they have lost weight! Like all weight loss medications, topiramate works best when you help it work.  This means:    1) Have less tempting high calorie (fattening) food around the house or office    2) Have lower calorie food (fruits, vegetables,low fat meats and dairy) for snacks    3) Eat out only one time or less each week.   4) Eat your meals at a table with the TV or computer off.    Side-effects.  Topiramate is generally well tolerated. The main side-effects we see are:   Tingling in hands,feet, or face (usually not very troublesome)   Mental confusion and word finding trouble (about 10% of patients have this.)     Feeling sleepy or a bit dopey- this goes away very soon after starting.    One of the dangers of topiramate is the possibility of birth defects--if you get pregnant when you are on it, there is the risk that your baby will be born with a cleft lip or palate.  If you are on topiramate and of child bearing age, you need to be on a reliable form of birth control or refrain from sexual intercourse.     Please refer to the pharmacy insert for more information on side-effects. Since many pharmacists are not familiar with the use of topiramate in weight loss, calling the clinic will get you the most accurate information on the use of this medication for weight loss.    In order to get refills of this or any medication we prescribe you must be seen in the medical weight mgmt clinic every 2-3 months.     If you have decided not to continue use of topiramate, it is important for you to decrease your dose slowly to avoid risk of seizures.  Please decrease your dose as follows:  25 mg daily for 3 days  Then stop

## 2022-03-09 NOTE — PROGRESS NOTES
MEDICAL WEIGHT LOSS INITIAL EVALUATION  DIAGNOSIS:  Class III Obesity    NUTRITION HISTORY:    Do you typically eat breakfast? Yes   If you do eat breakfast, what types of food do you eat? Over night Oats + blue berries or banana, almond milk or 2 hard cooked eggs, gluten free breads (pumpkin or banana) @ within 2 hours of waking   Do you typically eat lunch? Yes   If you do eat lunch, what types of food do you typically eat?  Gluten free sandwich turkey, cheese @ 11:30-12:00 bring lunch from home   Do you typically eat supper? Yes   If you do eat supper, what types of food do you typically eat? chickpea Pasta, chicken, beef or shellfish or vegetables @8 pm   Do you typically eat snacks? Yes   If you do snack, what types of food do you typically eat? Chips or pop corn or nuts -after dinner   Do you like vegetables?  Yes   Do you drink water? Yes-4-12 oz carbonated water, 32 oz water/ coffee with powdered or almond milk   How many glasses of juice do you drink in a typical day? 0   How many of glasses of milk do you drink in a typical day? 0   If you do drink milk, what type? N/A   How many 8oz glasses of sugar containing drinks such as Emmanuel-Aid/sweet tea do you drink in a day? 0   How many cans/bottles of sugar pop/soda/tea/sports drinks do you drink in a day? 1   How many cans/bottles of diet pop/soda/tea or sports drink do you drink in a day? 0   How often do you have a drink of alcohol? 4 or More Times a Week while cooking dinner   If you do drink, how many drinks might you have in a day? 1 or 2 (wine or vodka on rocks)     Dining out: rare  Binge eating: no  Emotional eating: yes-mainly related to  bored, anxious, happy/reward  Night time eating: no  Exercise: none (has treadmill, bands, videos, 2 dogs)  Other: Using [employer] meal kits (low calorie/ gluten free/ organic). Patient does most of the cooking for her and her spouse. She avoid gluten (feels bloated) and an food allergy to MSG. 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 Moved to [place] in 2018 and struggled with many changes after the move related to Covid 19. Has worked as a cardio kick . She needs a right knee replacement.     MEDICATION FOR WEIGHT  LOSS:  Plans to start Topamax    ANTHROPOMETRICS:  Height: 67\"   Weight: 264.2 lb   BMI:  41.38 kg/m2  Wt Readings from Last 5 Encounters:   01/14/22 120.7 kg (266 lb)   05/17/21 117.9 kg (260 lb)   02/21/19 104.3 kg (230 lb)   12/28/18 114 kg (251 lb 5.2 oz)   12/28/18 114.3 kg (252 lb)     NUTRITION DIAGNOSIS:   Obese, class III related to excess energy intake as evidence by BMI of  41.38  NUTRITION INTERVENTIONS  Nutrition Prescription:  Recommend modified energy- nutrient intake  Implementation:  Nutrition Education (Content):    Discussed My Plate Guidelines    Determined alternative to emotional eating    Reviewed beverage choices     Provided: My Plate Guidelines, Mindful Eating, Tips for Weight Loss and Weight Management,Sources of Protein for Weight Loss    Nutrition Education (Application):     Patient to practice goals as stated below    Patient verbalizes understanding of diet by wanting to get back to exercise.    Anticipate fair-good compliance    Goals:  Focus on alternatives to emotional eating  Exercise 3 times per week for 20 minutes  Reduce alcohol by 50%    FOLLOW UP AND MONITORING:    Other  - follow up in 4 weeks.     TIME SPENT WITH PATIENT:   32 minutes   Ad Mendez RD, LD  [facility], [place]  "

## 2022-03-09 NOTE — PROGRESS NOTES
"    New Medical Weight Management Consult        PATIENT:  Nora Og  MRN:         [id number]  :         1965  EDOUARD:         3/9/2022      Dear Hannah Espinoza CNP,      I had the pleasure of seeing your patient, Nora Og. Full intake/assessment was done to determine barriers to weight loss success and develop a treatment plan. Nora Og is a 56 year old female interested in treatment of medical problems associated with excess weight. She has a height of 5' 7\", a weight of 264 lbs 3.2 oz, and the calculated Body mass index is 41.38 kg/m .     Always had weight concerns but was in great shape until menopause about 7 years ago. Has put on 60 lbs since then. With covid life has been less active. Has had success with phentermine in the past.       ASSESSMENT/PLAN:  Class 3 severe obesity due to excess calories with Body Mass Index (BMI) of 41  Prediabetes  High cholesterol      Goals:  Sleep study   Twice weekly walking       Discussed bariatric surgery and insurance does not cover. Also discussed the 24 week program.    We discussed the role of pharmacological agents in the treatment of obesity and the \"off-label\" use of medications in this practice. We discussed the risks and benefits of each. We discussed indications, contraindications, potential side effects, and estimated costs of each. Discussed that medications must always be used together with lifestyle changes such as improvements in diet choices, portion control and establishing and maintaining a regular exercise program.     She has called insurance and they do not pay for injectable weight loss medications. Discussed topamax and phentermine today. Will send RX for topamax over going up to 50 mg daily. Patient information provided.  Will order EKG in case patient wants to return to phentermine.     Follow up with Lauren Bloch Pharm D in 6 weeks and provider in July.  Provider will be on ZENA.       All of patients questions about " "the weight loss program were answered fully.          She has the following co-morbidities:       3/3/2022   I have the following health issues associated with obesity: High Cholesterol, GERD (Reflux), Fatty Liver   I have the following symptoms associated with obesity: Knee Pain, Depression, Back Pain, Fatigue       Patient Goals 3/3/2022   I am interested in having a healthier weight to diminish current health problems: Yes   I am interested in having a healthier weight in order to prevent future health problems: Yes   I am interested in having a healthier weight in order to have a future surgery: Yes   If yes, please indicate which surgery? Sleeve, maybe       Referring Provider 3/3/2022   Please name the provider who referred you to Medical Weight Management.  If you do not know, please answer: \"I Don't Know\". Hannah Espinoza       Weight History 3/3/2022   How concerned are you about your weight? Very Concerned   Would you describe your weight gain as gradual? Yes   I became overweight: As a Teenager   The following factors have contributed to my weight gain:  Change in Schedule, Started on Medication that Caused Weight Gain, Eating Too Much, Lack of Exercise, Stress, Other   Please list the other factors.  Menopause   I have tried the following methods to lose weight: Watching Portions or Calories, Exercise, Weight Watchers, Medications   Please list the medications you have tried.  Phentermine, B12 shots   My lowest weight since age 16 was: 175   My highest weight since age 16 was: 265   The most weight I have ever lost was: (lbs) 40   I have the following family history of obesity/being overweight:  My mother is overweight, One or more of my siblings are overweight   Has anyone in your family had weight loss surgery? No   How has your weight changed over the last year?  Gained   How many pounds? 10     Was adopted. Met with family for the first time 2 years ago. Has gotten some family history.     Up 6-6:30 " am. Gets to work 7:30 am.  Eats a protein bar or couple eggs. 2 cups coffee with powder.   Diet Recall Review with Patient 3/3/2022   Do you typically eat breakfast? Yes   If you do eat breakfast, what types of food do you eat? Oats, eggs, gluten free breads   Do you typically eat lunch? Yes   If you do eat lunch, what types of food do you typically eat?  Gluten free sandwich turkey, cheese. Yesterday had sandwich with turkey and swiss cheese. Water   Do you typically eat supper? Yes   If you do eat supper, what types of food do you typically eat? Pasta, chicken, beef, shellfish, vegetables. Last night had tofu with Slovak peanut sauce and malia rice.    Do you typically eat snacks? Yes usually after dinner   If you do snack, what types of food do you typically eat? Chips, nuts, popcorn or slim jims   Do you like vegetables?  Yes   Do you drink water? Yes                4 - 12 of carbonated water, 32 oz water   How many glasses of juice do you drink in a typical day? 0   How many of glasses of milk do you drink in a typical day? 0   If you do drink milk, what type? N/A   How many 8oz glasses of sugar containing drinks such as Emmanuel-Aid/sweet tea do you drink in a day? 0   How many cans/bottles of sugar pop/soda/tea/sports drinks do you drink in a day? 1   How many cans/bottles of diet pop/soda/tea or sports drink do you drink in a day? 0   How often do you have a drink of alcohol? 4 or More Times a Week   If you do drink, how many drinks might you have in a day? 1 or 2       Eating Habits 3/3/2022   Generally, my meals include foods like these: bread, pasta, rice, potatoes, corn, crackers, sweet dessert, pop, or juice. A Few Times a Week   Generally, my meals include foods like these: fried meats, brats, burgers, french fries, pizza, cheese, chips, or ice cream. Less Than Weekly   Eat fast food (like McDonalds, Burger Lonnie, Taco Bell). Less Than Weekly   Eat at a buffet or sit-down restaurant. Less Than Weekly   Eat  most of my meals in front of the TV or computer. Almost Everyday   Often skip meals, eat at random times, have no regular eating times. A Few Times a Week   Rarely sit down for a meal but snack or graze throughout.  A Few Times a Week   Eat extra snacks between meals. A Few Times a Week   Eat most of my food at the end of the day. Almost Everyday   Eat in the middle of the night or wake up at night to eat. Never   Eat extra snacks to prevent or correct low blood sugar. Never   Eat to prevent acid reflux or stomach pain. Less Than Weekly   Worry about not having enough food to eat. Never   Have you been to the food shelf at least a few times this year? No   I eat when I am depressed. Once a Week   I eat when I am stressed. Once a Week   I eat when I am bored. A Few Times a Week   I eat when I am anxious. Once a Week   I eat when I am happy or as a reward. A Few Times a Week   I feel hungry all the time even if I just have eaten. Once a Week   Feeling full is important to me. Less Than Weekly   I finish all the food on my plate even if I am already full. Once a Week   I can't resist eating delicious food or walk past the good food/smell. A Few Times a Week   I eat/snack without noticing that I am eating. A Few Times a Week   I eat when I am preparing the meal. Once a Week   I eat more than usual when I see others eating. A Few Times a Week   I have trouble not eating sweets, ice cream, cookies, or chips if they are around the house. Once a Week   I think about food all day. Once a Week   What foods, if any, do you crave? Chips/Crackers   Please list any other foods you crave? Salty       Amount of Food 3/3/2022   I make myself vomit what I have eaten or use laxatives to get rid of food. Never   I eat a large amount of food, like a loaf of bread, a box of cookies, a pint/quart of ice cream, all at once. Never   I eat a large amount of food even when I am not hungry. Monthly   I eat rapidly. Monthly   I eat alone  because I feel embarrassed and do not want others to see how much I have eaten. Never   I eat until I am uncomfortably full. Weekly   I feel bad, disgusted, or guilty after I overeat. Almost Everyday   I make myself vomit what I have eaten or use laxatives to get rid of food. Never       Activity/Exercise History 3/3/2022   How much of a typical 12 hour day do you spend sitting? Less Than Half the Day   How much of a typical 12 hour day do you spend lying down? Less Than Half the Day   How much of a typical day do you spend walking/standing? Most of the Day   How many hours (not including work) do you spend on the TV/Video Games/Computer/Tablet/Phone? 2-3 Hours   How many times a week are you active for the purpose of exercise? Never   What keeps you from being more active? Too tired   How many total minutes do you spend doing some activity for the purpose of exercising when you exercise? None     Has not been exercising lately. More tired.     PAST MEDICAL HISTORY:  Past Medical History:   Diagnosis Date     Fracture of right shoulder 2018     Fracture of right wrist      HAYES (generalized anxiety disorder)      Moderate episode of recurrent major depressive disorder (H)        Work/Social History Reviewed With Patient 3/3/2022   My employment status is: Full-Time   My job is: [employer]   How much of your job is spent on the computer or phone? Less Than 50%   How many hours do you spend commuting to work daily?  15 minutes one way   What is your marital status? /In a Relationship   If in a relationship, is your significant other overweight? Yes   Do you have children? Yes   If you have children, are they overweight? No   Who do you live with?     Are they supportive of your health goals? Yes   Who does the food shopping?  Both       Mental Health History Reviewed With Patient 3/3/2022   Have you ever been physically or sexually abused? No   If yes, do you feel that the abuse is affecting your weight? N/A    If yes, would you like to talk to a counselor about the abuse? N/A   How often in the past 2 weeks have you felt little interest or pleasure in doing things? Not at all   Over the past 2 weeks how often have you felt down, depressed, or hopeless? Not at all       Sleep History Reviewed With Patient 3/3/2022   How many hours do you sleep at night? 5   Do you think that you snore loudly or has anybody ever heard you snore loudly (louder than talking or so loud it can be heard behind a shut door)? Yes   Has anyone seen or heard you stop breathing during your sleep? Yes   Do you often feel tired, fatigued, or sleepy during the day? Yes   Do you have a TV/Computer in your bedroom? Yes     ROS  General  Fatigue: No  Sleep Quality: No - not for most of her life  Birth Control: postmenopausal - last period 7 years ago  HEENT  Hx of glaucoma: No  Vision changes: No  Cardiovascular  Chest Pain with Exertion: No  Palpitations: No  Hx of heart disease: No  Pulmonary  Shortness of breath at rest: No  Shortness of breath with exertion:  Sometimes at work as an MA  Snoring: Yes - witnessed apneic spells   Gastrointestinal  Heartburn: yes - does not take anything. Gets once weekly  Abdominal pain: No  - diagnosed with IBS but not problematic currently   History of pancreatitis: No  Severe constipation: No  Hx of bowel obstruction: No  Gastrourinary  History of kidney stones: No  Psychiatric  Moods Stable: Yes  History of drug abuse: No  No history of eating disorder  Endocrine  Polydipsia: No  Polyuria: No  Personal or family history of thyroid cancer: No  Neurologic:  Hx of seizures: No  Hx of paresthesias: No      MEDICATIONS:   Current Outpatient Medications   Medication Sig Dispense Refill     busPIRone (BUSPAR) 10 MG tablet Take 1 tablet (10 mg) by mouth 2 times daily 180 tablet 3     Magnesium Bisglycinate (MAG GLYCINATE) 100 MG TABS        PARoxetine (PAXIL) 30 MG tablet Take 1 tablet (30 mg) by mouth every morning 90  "tablet 2     vitamin D3 (CHOLECALCIFEROL) 125 MCG (5000 UT) tablet          ALLERGIES:   Allergies   Allergen Reactions     Gluten Meal      intolerance     Mold Cough and Other (See Comments)     Monosodium Glutamate GI Disturbance, Headache and Nausea and Vomiting     Pollen Extract Cough and Other (See Comments)     Seasonal allergies     Bupropion Anxiety         PHYSICAL EXAM:  /86   Ht 5' 7\" (1.702 m)   Wt 264 lb 3.2 oz (119.8 kg)   BMI 41.38 kg/m    GENERAL: Healthy, alert and no distress  EYES: Eyes grossly normal to inspection.  No discharge or erythema, or obvious scleral/conjunctival abnormalities.  RESP: No audible wheeze, cough, or visible cyanosis.  No visible retractions or increased work of breathing.    SKIN: Visible skin clear. No significant rash, abnormal pigmentation or lesions.  NEURO: Cranial nerves grossly intact.  Mentation and speech appropriate for age.  PSYCH: Mentation appears normal, affect normal/bright, judgement and insight intact, normal speech and appearance well-groomed.          Sincerely,    Jose M Montejo PA-C      "

## 2022-03-09 NOTE — PROGRESS NOTES
Patients first measurements in the medical weight loss program     Date: 03/09/22    Neck: 17.75  Waist: 49  Hip: 55.25

## 2022-03-14 ENCOUNTER — TELEPHONE (OUTPATIENT)
Dept: SURGERY | Facility: CLINIC | Age: 57
End: 2022-03-14
Payer: COMMERCIAL

## 2022-03-14 NOTE — TELEPHONE ENCOUNTER
MTM referral from: [facility] visit (referral by provider)    MTM referral outreach attempt #2 on March 14, 2022 at 1:51 PM      Outcome: Patient not reachable after several attempts, will route to MTM Pharmacist/Provider as an FYI.  MT scheduling number is [phone].  Thank you for the referral.    Alfonso Porter, MTM coordinator

## 2022-03-16 ENCOUNTER — ALLIED HEALTH/NURSE VISIT (OUTPATIENT)
Dept: PEDIATRICS | Facility: CLINIC | Age: 57
End: 2022-03-16
Payer: COMMERCIAL

## 2022-03-16 DIAGNOSIS — Z23 NEED FOR SHINGLES VACCINE: Primary | ICD-10-CM

## 2022-03-16 PROCEDURE — 90750 HZV VACC RECOMBINANT IM: CPT

## 2022-03-16 PROCEDURE — 90471 IMMUNIZATION ADMIN: CPT

## 2022-04-05 ENCOUNTER — VIRTUAL VISIT (OUTPATIENT)
Dept: ONCOLOGY | Facility: CLINIC | Age: 57
End: 2022-04-05
Attending: NURSE PRACTITIONER
Payer: COMMERCIAL

## 2022-04-05 DIAGNOSIS — Z80.3 FAMILY HISTORY OF MALIGNANT NEOPLASM OF BREAST: ICD-10-CM

## 2022-04-05 PROCEDURE — 96040 HC GENETIC COUNSELING, EACH 30 MINUTES: CPT | Mod: GT,95 | Performed by: GENETIC COUNSELOR, MS

## 2022-04-05 NOTE — PROGRESS NOTES
4/5/2022    Nora is a 56 year old who is being evaluated via a billable video visit.      How would you like to obtain your AVS? MyChart  If the video visit is dropped, the invitation should be resent by: Text to cell phone: [phone]  Will anyone else be joining your video visit? No    Video-Visit Details  Type of service:  Video Visit  Video Start Time: 11:52am  Video End Time:12:35pm  Originating Location (pt. Location): Home  Distant Location (provider location):  [facility]   Platform used for Video Visit: [email or web address]     Referring Provider: ABHINAV Magallanes CNP    Presenting Information:   Given concerns regarding the potential for COVID-19 exposure during a clinic visit, Nora elected for a video genetic counseling visit through the Cancer Risk Management Program to discuss her family history of breast cancer. We reviewed this history, cancer screening recommendations, and available genetic testing options.    Personal History:  Nora is a 56 year old female. She does not have any personal history of cancer.    She had her first menstrual period at age 9, does not have biological children, and went through menopause around age 50. Nora has her ovaries, fallopian tubes and uterus in place, and she has had no ovarian cancer screening to date. She reports that she used hormone replacement therapy for approximately one month in 2020.      She has annual mammograms and her most recent mammogram in July 2021 was normal. She reports that her first colonoscopy in 2015 was normal and follow-up was recommended in 10 years. She does not regularly do any other cancer screening at this time.    Family History: (Please see scanned pedigree for detailed family history information)    Nora shared that she was adopted, but she recently connected with her biological full sisters (ages 54 and 51). Neither sister has had a cancer and they reportedly each had negative testing of the BRCA1 and  BRCA2 genes.    Nora's biological mother had a lumpectomy in her 30's for unknown reasons and passed away in her 80's. She did not require additional treatment.    One biological maternal aunt was diagnosed with breast cancer at an unknown age and passed away.    Nora reports that she has no other known family history of cancer, but has limited information regarding other biological relatives.    Her maternal ethnicity is Tristanian, Uzbek, and [place]ik. Her paternal ethnicity is [place]. There is no known Ashkenazi Mandaeism ancestry on either side of her family.    Discussion:    Nora's family history of breast cancer may be suggestive of a hereditary cancer syndrome.    We reviewed the features of sporadic, familial, and hereditary cancers. In looking at Nora's maternal family history, it is possible that a cancer susceptibility gene is present as her biological mother and maternal aunt may have both been diagnosed with breast cancer; her biological mother may have also been diagnosed under age 50. That being said, it cannot be confirmed that her biological mother had a breast cancer versus benign breast lesion. It should also be noted that Nora has limited information regarding her other biological relatives, which may cause an underestimation of the chance for a hereditary cancer syndrome in her family.    We discussed the natural history and genetics of hereditary breast cancer, including Hereditary Breast and Ovarian Cancer (HBOC) syndrome.     We reviewed that the most common cause of hereditary breast cancer is HBOC syndrome, which is caused by mutations in the BRCA1 and BRCA2 genes. Individuals with HBOC syndrome are at increased risk for several different cancers, including breast, ovarian, male breast, prostate, melanoma, and pancreatic cancer.    We discussed that there are additional genes that could cause increased risk for breast and related cancer. As many of these genes present with overlapping  features in a family and accurate cancer risk cannot always be established based upon the pedigree analysis alone, it is often reasonable to consider panel genetic testing to analyze multiple genes at once.    Based on her family history and assuming her biological mother had an early-onset breast cancer, Nora meets current National Comprehensive Cancer Network (NCCN) criteria for genetic testing of high penetrance breast cancer genes (i.e. BRCA1, BRCA2, CDH1, PALB2, PTEN, TP53, etc.). Of note, though her maternal full sisters reportedly had negative testing of the BRCA1 and BRCA2 genes, this does not eliminate the chance that Nora could carry a mutation as she only shares 50% of her DNA with these relatives.     A detailed handout regarding these genes/syndromes and the information we discussed was provided to Nora at the end of our appointment via [facility] and can be found in the after visit summary. Topics included: inheritance pattern, cancer risks, cancer screening recommendations, and also risks, benefits and limitations of testing.    We discussed that genetic testing for cancer susceptibility genes is typically most informative, though, when it is first performed on a family member with a personal history of cancer. Testing is available to Nora, but with limitations. If Nora pursues testing at this time and receives a negative result, this does not rule out the possibility of a hereditary cancer syndrome in her and/or her family. Despite these limitations and given that her biological mother has passed away (as well as her limited contact with her other biological relatives), Nora expressed interest in proceeding with testing herself.    We reviewed genetic testing options for hereditary breast and related cancers: actionable breast and gynecologic cancer panel ([facility] Breast [facility] G[facility] Cancers Guidelines-Based Panel) and expanded pan-cancer panels (such as the [facility] Common Hereditary Cancers Panel or  Multi-Cancer Panel). Nora expressed interest in learning as much information as possible from the testing as she has limited information regarding her biological relatives. She opted for the [facility] Multi-Cancer Panel.  The [facility] Multi-Cancer Panel analyzes 84 genes associated with increased risk for cancer: AIP, ALK, APC, ARNIE, AXIN2, BAP1, BARD1, BLM, BMPR1A, BRCA1, BRCA2, BRIP1, CASR,CDC73, CDH1, CDK4, CDKN1B, CDKN1C, CDKN2A, CEBPA, CHEK2, CTNNA1, DICER1,DIS3L2, EGFR, EPCAM, FH, FLCN, GATA2, GPC3, GREM1, HOXB13, HRAS, KIT, MAX,MEN1, MET, MITF, MLH1, MSH2, MSH3, MSH6, MUTYH, NBN, NF1, NF2, NTHL1,PALB2, PDGFRA, PHOX2B, PMS2, POLD1, POLE, POT1, BNZLJ5W, PTCH1, PTEN, RAD50,RAD51C, RAD51D, RB1, RECQL4, RET, RUNX1, SDHA, SDHAF2, SDHB, SDHC, SDHD,SMAD4, SMARCA4, SMARCB1, SMARCE1, STK11, SUFU, TERC, TERT, OOKN347, TP53,TSC1, TSC2, VHL, WRN, WT1.  This panel includes genes associated with hereditary cancers across multiple major organ systems, including: breast and gynecologic (breast, ovarian, uterine), gastrointestinal (colorectal, gastric, pancreatic), endocrine (thyroid, paraganglioma/pheochromocytoma, parathyroid, pituitary), genitourinary (renal/urinary tract, prostate), skin (melanoma, basal cell carcinoma), brain/nervous system, sarcoma, and hematologic (myelodysplastic syndrome/leukemia).  Individuals who are found to carry a mutation in one of these genes are at increased risk of developing certain cancers/tumors. Depending on the gene mutation found, identifying those at elevated risk may guide implementation of additional screening, surveillance, and other interventions.    Consent was obtained over the video. Nora elected to schedule a lab appointment at the [facility] on 4/13/22 when she is there for another appointment. Once her blood is drawn, genetic testing using the Multi-Cancer Panel will be sent to [facility]. Of note, genetic testing of the BRCA1 and BRCA2 genes will be  performed first, with reflex to the entire panel. Turn around time: 2-3 weeks after [facility] receives her blood sample.    Medical Management: For Nora, we reviewed that the information from genetic testing may determine:    additional cancer screening for which Nora may qualify (i.e. mammogram and breast MRI, more frequent colonoscopies, more frequent dermatologic exams, etc.),    options for risk reducing surgeries Nora could consider (i.e. bilateral mastectomy, surgery to remove her ovaries and/or uterus, etc.),      and targeted chemotherapies if she were to develop certain cancers in the future (i.e. immunotherapy for individuals with Gusman syndrome, PARP inhibitors, etc.).     These recommendations and possible targeted chemotherapies will be discussed in detail once genetic testing is completed.     Plan:  1) Today Nora elected to proceed with genetic testing of the BRCA1 and BRCA2 genes, with reflex to the Multi-Cancer Panel, through [facility]. Nora will schedule a lab appointment on 4/13/2022.  2) The results should be available 2-3 weeks after [facility] receives her blood sample.  3) Nora will be contacted to schedule a virtual visit to discuss the results.    Anca Duncan MS, [facility]  Licensed, Certified Genetic Counselor  Office: [phone]  Pager: [phone]

## 2022-04-05 NOTE — LETTER
4/5/2022         RE: Nora Og  [address] [place] [address]      4/5/2022    Nora is a 56 year old who is being evaluated via a billable video visit.      How would you like to obtain your AVS? MyChart  If the video visit is dropped, the invitation should be resent by: Text to cell phone: [phone]  Will anyone else be joining your video visit? No      Referring Provider: ABHINAV Magallanes CNP    Presenting Information:   Given concerns regarding the potential for COVID-19 exposure during a clinic visit, Nora elected for a video genetic counseling visit through the [facility] to discuss her family history of breast cancer. We reviewed this history, cancer screening recommendations, and available genetic testing options.    Personal History:  Nora is a 56 year old female. She does not have any personal history of cancer.    She had her first menstrual period at age 9, does not have biological children, and went through menopause around age 50. Nora has her ovaries, fallopian tubes and uterus in place, and she has had no ovarian cancer screening to date. She reports that she used hormone replacement therapy for approximately one month in 2020.      She has annual mammograms and her most recent mammogram in July 2021 was normal. She reports that her first colonoscopy in 2015 was normal and follow-up was recommended in 10 years. She does not regularly do any other cancer screening at this time.    Family History: (Please see scanned pedigree for detailed family history information)    Nora shared that she was adopted, but she recently connected with her biological full sisters (ages 54 and 51). Neither sister has had a cancer and they reportedly each had negative testing of the BRCA1 and BRCA2 genes.    Nora's biological mother had a lumpectomy in her 30's for unknown reasons and passed away in her 80's. She did not require additional treatment.    One biological maternal  aunt was diagnosed with breast cancer at an unknown age and passed away.    Nora reports that she has no other known family history of cancer, but has limited information regarding other biological relatives.    Her maternal ethnicity is Citizen of [place], Korean, and Citizen of [place]. Her paternal ethnicity is [place]. There is no known Ashkenazi Denominational ancestry on either side of her family.    Discussion:    Nora's family history of breast cancer may be suggestive of a hereditary cancer syndrome.    We reviewed the features of sporadic, familial, and hereditary cancers. In looking at Nora's maternal family history, it is possible that a cancer susceptibility gene is present as her biological mother and maternal aunt may have both been diagnosed with breast cancer; her biological mother may have also been diagnosed under age 50. That being said, it cannot be confirmed that her biological mother had a breast cancer versus benign breast lesion. It should also be noted that oNra has limited information regarding her other biological relatives, which may cause an underestimation of the chance for a hereditary cancer syndrome in her family.    We discussed the natural history and genetics of hereditary breast cancer, including Hereditary Breast and Ovarian Cancer (HBOC) syndrome.     We reviewed that the most common cause of hereditary breast cancer is HBOC syndrome, which is caused by mutations in the BRCA1 and BRCA2 genes. Individuals with HBOC syndrome are at increased risk for several different cancers, including breast, ovarian, male breast, prostate, melanoma, and pancreatic cancer.    We discussed that there are additional genes that could cause increased risk for breast and related cancer. As many of these genes present with overlapping features in a family and accurate cancer risk cannot always be established based upon the pedigree analysis alone, it is often reasonable to consider panel genetic testing to analyze multiple  genes at once.    Based on her family history and assuming her biological mother had an early-onset breast cancer, Nora meets current National Comprehensive Cancer Network (NCCN) criteria for genetic testing of high penetrance breast cancer genes (i.e. BRCA1, BRCA2, CDH1, PALB2, PTEN, TP53, etc.). Of note, though her maternal full sisters reportedly had negative testing of the BRCA1 and BRCA2 genes, this does not eliminate the chance that Nora could carry a mutation as she only shares 50% of her DNA with these relatives.     A detailed handout regarding these genes/syndromes and the information we discussed was provided to Nora at the end of our appointment via [employer] and can be found in the after visit summary. Topics included: inheritance pattern, cancer risks, cancer screening recommendations, and also risks, benefits and limitations of testing.    We discussed that genetic testing for cancer susceptibility genes is typically most informative, though, when it is first performed on a family member with a personal history of cancer. Testing is available to Nora, but with limitations. If Nora pursues testing at this time and receives a negative result, this does not rule out the possibility of a hereditary cancer syndrome in her and/or her family. Despite these limitations and given that her biological mother has passed away (as well as her limited contact with her other biological relatives), Nora expressed interest in proceeding with testing herself.    We reviewed genetic testing options for hereditary breast and related cancers: actionable breast and gynecologic cancer panel ([facility] Breast [facility] G[facility] Cancers Guidelines-Based Panel) and expanded pan-cancer panels (such as the [facility] Common Hereditary Cancers Panel or Multi-Cancer Panel). Nora expressed interest in learning as much information as possible from the testing as she has limited information regarding her biological relatives. She opted for the  [facility] Multi-Cancer Panel.  The [facility] Multi-Cancer Panel analyzes 84 genes associated with increased risk for cancer: AIP, ALK, APC, ARNIE, AXIN2, BAP1, BARD1, BLM, BMPR1A, BRCA1, BRCA2, BRIP1, CASR,CDC73, CDH1, CDK4, CDKN1B, CDKN1C, CDKN2A, CEBPA, CHEK2, CTNNA1, DICER1,DIS3L2, EGFR, EPCAM, FH, FLCN, GATA2, GPC3, GREM1, HOXB13, HRAS, KIT, MAX,MEN1, MET, MITF, MLH1, MSH2, MSH3, MSH6, MUTYH, NBN, NF1, NF2, NTHL1,PALB2, PDGFRA, PHOX2B, PMS2, POLD1, POLE, POT1, AQWFW3A, PTCH1, PTEN, RAD50,RAD51C, RAD51D, RB1, RECQL4, RET, RUNX1, SDHA, SDHAF2, SDHB, SDHC, SDHD,SMAD4, SMARCA4, SMARCB1, SMARCE1, STK11, SUFU, TERC, TERT, [id number], TP53,TSC1, TSC2, VHL, WRN, WT1.  This panel includes genes associated with hereditary cancers across multiple major organ systems, including: breast and gynecologic (breast, ovarian, uterine), gastrointestinal (colorectal, gastric, pancreatic), endocrine (thyroid, paraganglioma/pheochromocytoma, parathyroid, pituitary), genitourinary (renal/urinary tract, prostate), skin (melanoma, basal cell carcinoma), brain/nervous system, sarcoma, and hematologic (myelodysplastic syndrome/leukemia).  Individuals who are found to carry a mutation in one of these genes are at increased risk of developing certain cancers/tumors. Depending on the gene mutation found, identifying those at elevated risk may guide implementation of additional screening, surveillance, and other interventions.    Consent was obtained over the video. Nora elected to schedule a lab appointment at the [facility] on 4/13/22 when she is there for another appointment. Once her blood is drawn, genetic testing using the Multi-Cancer Panel will be sent to [facility]. Of note, genetic testing of the BRCA1 and BRCA2 genes will be performed first, with reflex to the entire panel. Turn around time: 2-3 weeks after Adrián receives her blood sample.    Medical Management: For Nora, we reviewed that the information from genetic  testing may determine:    additional cancer screening for which Nora may qualify (i.e. mammogram and breast MRI, more frequent colonoscopies, more frequent dermatologic exams, etc.),    options for risk reducing surgeries Nora could consider (i.e. bilateral mastectomy, surgery to remove her ovaries and/or uterus, etc.),      and targeted chemotherapies if she were to develop certain cancers in the future (i.e. immunotherapy for individuals with Gusman syndrome, PARP inhibitors, etc.).     These recommendations and possible targeted chemotherapies will be discussed in detail once genetic testing is completed.     Plan:  1) Today Nora elected to proceed with genetic testing of the BRCA1 and BRCA2 genes, with reflex to the Multi-Cancer Panel, through [facility]. Nora will schedule a lab appointment on 4/13/2022.  2) The results should be available 2-3 weeks after Adrián receives her blood sample.  3) Nora will be contacted to schedule a virtual visit to discuss the results.        Anca Duncan, GC

## 2022-04-05 NOTE — LETTER
4/5/2022         RE: Nora Og  [address] [place] [address]        Dear Colleague,    Thank you for referring your patient, Nora Og, to the [facility]. Please see a copy of my visit note below.    4/5/2022    Nora is a 56 year old who is being evaluated via a billable video visit.      How would you like to obtain your AVS? MyChart  If the video visit is dropped, the invitation should be resent by: Text to cell phone: [phone]  Will anyone else be joining your video visit? No    Video-Visit Details  Type of service:  Video Visit  Video Start Time: 11:52am  Video End Time:12:35pm  Originating Location (pt. Location): Home  Distant Location (provider location):  [facility]   Platform used for Video Visit: [employer]     Referring Provider: ABHINAV Magallanes CNP    Presenting Information:   Given concerns regarding the potential for COVID-19 exposure during a clinic visit, Nora elected for a video genetic counseling visit through the [facility] to discuss her family history of breast cancer. We reviewed this history, cancer screening recommendations, and available genetic testing options.    Personal History:  Nora is a 56 year old female. She does not have any personal history of cancer.    She had her first menstrual period at age 9, does not have biological children, and went through menopause around age 50. Nora has her ovaries, fallopian tubes and uterus in place, and she has had no ovarian cancer screening to date. She reports that she used hormone replacement therapy for approximately one month in 2020.      She has annual mammograms and her most recent mammogram in July 2021 was normal. She reports that her first colonoscopy in 2015 was normal and follow-up was recommended in 10 years. She does not regularly do any other cancer screening at this time.    Family History: (Please see scanned pedigree for  detailed family history information)    Nora shared that she was adopted, but she recently connected with her biological full sisters (ages 54 and 51). Neither sister has had a cancer and they reportedly each had negative testing of the BRCA1 and BRCA2 genes.    Nora's biological mother had a lumpectomy in her 30's for unknown reasons and passed away in her 80's. She did not require additional treatment.    One biological maternal aunt was diagnosed with breast cancer at an unknown age and passed away.    Nora reports that she has no other known family history of cancer, but has limited information regarding other biological relatives.    Her maternal ethnicity is Citizen of [place], [place]i, and [place]ese. Her paternal ethnicity is [place]. There is no known Ashkenazi Scientology ancestry on either side of her family.    Discussion:    Nora's family history of breast cancer may be suggestive of a hereditary cancer syndrome.    We reviewed the features of sporadic, familial, and hereditary cancers. In looking at Nora's maternal family history, it is possible that a cancer susceptibility gene is present as her biological mother and maternal aunt may have both been diagnosed with breast cancer; her biological mother may have also been diagnosed under age 50. That being said, it cannot be confirmed that her biological mother had a breast cancer versus benign breast lesion. It should also be noted that Nora has limited information regarding her other biological relatives, which may cause an underestimation of the chance for a hereditary cancer syndrome in her family.    We discussed the natural history and genetics of hereditary breast cancer, including Hereditary Breast and Ovarian Cancer (HBOC) syndrome.     We reviewed that the most common cause of hereditary breast cancer is HBOC syndrome, which is caused by mutations in the BRCA1 and BRCA2 genes. Individuals with HBOC syndrome are at increased risk for several different cancers,  including breast, ovarian, male breast, prostate, melanoma, and pancreatic cancer.    We discussed that there are additional genes that could cause increased risk for breast and related cancer. As many of these genes present with overlapping features in a family and accurate cancer risk cannot always be established based upon the pedigree analysis alone, it is often reasonable to consider panel genetic testing to analyze multiple genes at once.    Based on her family history and assuming her biological mother had an early-onset breast cancer, Nora meets current National Comprehensive Cancer Network (NCCN) criteria for genetic testing of high penetrance breast cancer genes (i.e. BRCA1, BRCA2, CDH1, PALB2, PTEN, TP53, etc.). Of note, though her maternal full sisters reportedly had negative testing of the BRCA1 and BRCA2 genes, this does not eliminate the chance that Nora could carry a mutation as she only shares 50% of her DNA with these relatives.     A detailed handout regarding these genes/syndromes and the information we discussed was provided to Nora at the end of our appointment via [employer] and can be found in the after visit summary. Topics included: inheritance pattern, cancer risks, cancer screening recommendations, and also risks, benefits and limitations of testing.    We discussed that genetic testing for cancer susceptibility genes is typically most informative, though, when it is first performed on a family member with a personal history of cancer. Testing is available to Nora, but with limitations. If Nora pursues testing at this time and receives a negative result, this does not rule out the possibility of a hereditary cancer syndrome in her and/or her family. Despite these limitations and given that her biological mother has passed away (as well as her limited contact with her other biological relatives), Nora expressed interest in proceeding with testing herself.    We reviewed genetic testing  options for hereditary breast and related cancers: actionable breast and gynecologic cancer panel ([facility] Breast [facility] G[facility] Cancers Guidelines-Based Panel) and expanded pan-cancer panels (such as the [facility] Common Hereditary Cancers Panel or Multi-Cancer Panel). Nora expressed interest in learning as much information as possible from the testing as she has limited information regarding her biological relatives. She opted for the [facility] Multi-Cancer Panel.  The [facility] Multi-Cancer Panel analyzes 84 genes associated with increased risk for cancer: AIP, ALK, APC, ARNIE, AXIN2, BAP1, BARD1, BLM, BMPR1A, BRCA1, BRCA2, BRIP1, CASR,CDC73, CDH1, CDK4, CDKN1B, CDKN1C, CDKN2A, CEBPA, CHEK2, CTNNA1, DICER1,DIS3L2, EGFR, EPCAM, FH, FLCN, GATA2, GPC3, GREM1, HOXB13, HRAS, KIT, MAX,MEN1, MET, MITF, MLH1, MSH2, MSH3, MSH6, MUTYH, NBN, NF1, NF2, NTHL1,PALB2, PDGFRA, PHOX2B, PMS2, POLD1, POLE, POT1, TEHON0P, PTCH1, PTEN, RAD50,RAD51C, RAD51D, RB1, RECQL4, RET, RUNX1, SDHA, SDHAF2, SDHB, SDHC, SDHD,SMAD4, SMARCA4, SMARCB1, SMARCE1, STK11, SUFU, TERC, TERT, [id number], TP53,TSC1, TSC2, VHL, WRN, WT1.  This panel includes genes associated with hereditary cancers across multiple major organ systems, including: breast and gynecologic (breast, ovarian, uterine), gastrointestinal (colorectal, gastric, pancreatic), endocrine (thyroid, paraganglioma/pheochromocytoma, parathyroid, pituitary), genitourinary (renal/urinary tract, prostate), skin (melanoma, basal cell carcinoma), brain/nervous system, sarcoma, and hematologic (myelodysplastic syndrome/leukemia).  Individuals who are found to carry a mutation in one of these genes are at increased risk of developing certain cancers/tumors. Depending on the gene mutation found, identifying those at elevated risk may guide implementation of additional screening, surveillance, and other interventions.    Consent was obtained over the video. Nora elected to schedule a lab appointment at the [facility] on 4/13/22 when she is there for another appointment. Once her blood is drawn, genetic testing using the Multi-Cancer Panel will be sent to [facility]. Of note, genetic testing of the BRCA1 and BRCA2 genes will be performed first, with reflex to the entire panel. Turn around time: 2-3 weeks after Adrián receives her blood sample.    Medical Management: For Nora, we reviewed that the information from genetic testing may determine:    additional cancer screening for which Nora may qualify (i.e. mammogram and breast MRI, more frequent colonoscopies, more frequent dermatologic exams, etc.),    options for risk reducing surgeries Nora could consider (i.e. bilateral mastectomy, surgery to remove her ovaries and/or uterus, etc.),      and targeted chemotherapies if she were to develop certain cancers in the future (i.e. immunotherapy for individuals with Gusman syndrome, PARP inhibitors, etc.).     These recommendations and possible targeted chemotherapies will be discussed in detail once genetic testing is completed.     Plan:  1) Today Nora elected to proceed with genetic testing of the BRCA1 and BRCA2 genes, with reflex to the Multi-Cancer Panel, through [facility]. Nora will schedule a lab appointment on 4/13/2022.  2) The results should be available 2-3 weeks after Adrián receives her blood sample.  3) Nora will be contacted to schedule a virtual visit to discuss the results.        Again, thank you for allowing me to participate in the care of your patient.      Sincerely,    Anca Duncan GC

## 2022-04-06 NOTE — PATIENT INSTRUCTIONS
Assessing Cancer Risk  Only about 5-10% of cancers are thought to be due to an inherited cancer susceptibility gene.    These families often have:    Several people with the same or related types of cancer    Cancers diagnosed at a young age (before age 50)    Individuals with more than one primary cancer    Multiple generations of the family affected with cancer    Some people may be candidates for genetic testing of more than one gene.  For these families, genetic testing using a cancer panel may be offered.  These panels will test different genes known to increase the risk for breast, ovarian, uterine, and/or other cancers. All of the genes discussed below have published clinical management guidelines for individuals who are found to carry a mutation. The purpose of this handout is to serve as a brief summary of the genes analyzed by the panels used to inquire about hereditary breast and gynecologic cancer:  ARNIE, BRCA1, BRCA2, BRIP1, CDH1, CHEK2, MLH1, MSH2, MSH6, PMS2, EPCAM, PTEN, PALB2, RAD51C, RAD51D, and TP53.  ______________________________________________________________________________  Hereditary Breast and Ovarian Cancer Syndrome   (BRCA1 and BRCA2)  A single mutation in one of the copies of BRCA1 or BRCA2 increases the risk for breast and ovarian cancer, among others.  The risk for pancreatic cancer and melanoma may also be slightly increased in some families.  The chart below shows the chance that someone with a BRCA mutation would develop cancer in his or her lifetime1,2,3,4.        A person s ethnic background is also important to consider, as individuals of [place]kenazi [place] ancestry have a higher chance of having a BRCA gene mutation.  There are three BRCA mutations that occur more frequently in this population.    Gusman Syndrome   (MLH1, MSH2, MSH6, PMS2, and EPCAM)  Currently five genes are known to cause Gusman Syndrome: MLH1, MSH2, MSH6, PMS2, and EPCAM.  A single mutation in one of the  Gusman Syndrome genes increases the risk for colon, endometrial, ovarian, and stomach cancers.  Other cancers that occur less commonly in Gusman Syndrome include urinary tract, skin, and brain cancers.  The chart below shows the chance that a person with Gusman syndrome would develop cancer in his or her lifetime5.      *Cancer risk varies depending on Gusman syndrome gene found    Cowden Syndrome   (PTEN)  Cowden syndrome is a hereditary condition that increases the risk for breast, thyroid, endometrial, colon, and kidney cancer.  Cowden syndrome is caused by a mutation in the PTEN gene.  A single mutation in one of the copies of PTEN causes Cowden syndrome and increases cancer risk.  The chart below shows the chance that someone with a PTEN mutation would develop cancer in their lifetime6,7.  Other benign features seen in some individuals with Cowden syndrome include benign skin lesions (facial papules, keratoses, lipomas), learning disability, autism, thyroid nodules, colon polyps, and larger head size.      *One recent study found breast cancer risk to be increased to 85%    Li-Fraumeni Syndrome   (TP53)  Li-Fraumeni Syndrome (LFS) is a cancer predisposition syndrome caused by a mutation in the TP53 gene. A single mutation in one of the copies of TP53 increases the risk for multiple cancers. Individuals with LFS are at an increased risk for developing cancer at a young age. The lifetime risk for development of a LFS-associated cancer is 50% by age 30 and 90% by age 60.   Core Cancers: Sarcomas, Breast, Brain, Lung, Leukemias/Lymphomas, Adrenocortical carcinomas  Other Cancers: Gastrointestinal, Thyroid, Skin, Genitourinary    Hereditary Diffuse Gastric Cancer   (CDH1)  Currently, one gene is known to cause hereditary diffuse gastric cancer (HDGC): CDH1.  Individuals with HDGC are at increased risk for diffuse gastric cancer and lobular breast cancer. Of people diagnosed with HDGC, 30-50% have a mutation in the CDH1  gene.  This suggests there are likely other genes that may cause HDGC that have not been identified yet.      Lifetime Cancer Risks    General Population HDGC    Diffuse Gastric  <1% ~80%   Breast 12% 39-52%         Additional Genes  ARNIE  ARNIE is a moderate-risk breast cancer gene. Women who have a mutation in ARNIE can have between a 2-4 fold increased risk for breast cancer compared to the general population8. ARNIE mutations have also been associated with increased risk for pancreatic cancer, however an estimate of this cancer risk is not well understood9. Individuals who inherit two ARNIE mutations have a condition called ataxia-telangiectasia (AT).  This rare autosomal recessive condition affects the nervous system and immune system, and is associated with progressive cerebellar ataxia beginning in childhood.  Individuals with ataxia-telangiectasia often have a weakened immune system and have an increased risk for childhood cancers.    PALB2  Mutations in PALB2 have been shown to increase the risk of breast cancer up to 33-58% in some families; where individuals fall within this risk range is dependent upon family [id number]. PALB2 mutations have also been associated with increased risk for pancreatic cancer, although this risk has not been quantified yet.  Individuals who inherit two PALB2 mutations--one from their mother and one from their father--have a condition called Fanconi Anemia.  This rare autosomal recessive condition is associated with short stature, developmental delay, bone marrow failure, and increased risk for childhood cancers.    CHEK2   CHEK2 is a moderate-risk breast cancer gene.  Women who have a mutation in CHEK2 have around a 2-fold increased risk for breast cancer compared to the general population, and this risk may be higher depending upon family history.11,12,13 Mutations in CHEK2 have also been shown to increase the risk of a number of other cancers, including colon and prostate, however  these cancer risks are currently not well understood.    BRIP1, RAD51C and RAD51D  Mutations in BRIP1, RAD51C, and RAD51D have been shown to increase the risk of ovarian cancer and possibly female breast cancer as well14,15 .       Lifetime Cancer Risk    General Population BRIP1 RAD51C RAD51D   Ovarian 1-2% ~5-8% ~5-9% ~7-15%           Inheritance  All of the cancer syndromes reviewed above are inherited in an autosomal dominant pattern.  This means that if a parent has a mutation, each of his or her children will have a 50% chance of inheriting that same mutation.  Therefore, each child--male or female--would have a 50% chance of being at increased risk for developing cancer.      Image obtained from Genetics Home Reference, 2013     Mutations in some genes can occur de devon, which means that a person s mutation occurred for the first time in them and was not inherited from a parent.  Now that they have the mutation, however, it can be passed on to future generations.    Genetic Testing  Genetic testing involves a blood test and will look at the genetic information in the ARNIE, BRCA1, BRCA2, BRIP1, CDH1, CHEK2, MLH1, MSH2, MSH6, PMS2, EPCAM, PTEN, PALB2, RAD51C, RAD51D, and TP53 genes for any harmful mutations that are associated with increased cancer risk.  If possible, it is recommended that the person(s) who has had cancer be tested before other family members.  That person will give us the most useful information about whether or not a specific gene is associated with the cancer in the family.    Results  There are three possible results of genetic testing:    Positive--a harmful mutation was identified in one or more of the genes    Negative--no mutation was identified in any of the genes on this panel    Variant of unknown significance--a variation in one of the genes was identified, but it is unclear how this impacts cancer risk in the family    Advantages and Disadvantages   There are advantages and  disadvantages to genetic testing.    Advantages    May clarify your cancer risk    Can help you make medical decisions    May explain the cancers in your family    May give useful information to your family members (if you share your results)    Disadvantages    Possible negative emotional impact of learning about inherited cancer risk    Uncertainty in interpreting a negative test result in some situations    Possible genetic discrimination concerns (see below)    Genetic Information Nondiscrimination Act (ELMIRA)  [place] is a federal law that protects individuals from health insurance or employment discrimination based on a genetic test result alone.  Although rare, there are currently no legal discrimination protections in terms of life insurance, long term care, or disability insurances.  Visit the [employer] website to learn more.    Reducing Cancer Risk  All of the genes described above have nationally recognized cancer screening guidelines that would be recommended for individuals who test positive.  In addition to increased cancer screening, surgeries may be offered or recommended to reduce cancer risk.  Recommendations are based upon an individual s genetic test result as well as their personal and family history of cancer.    Questions to Think About Regarding Genetic Testing:    What effect will the test result have on me and my relationship with my family members if I have an inherited gene mutation?  If I don t have a gene mutation?    Should I share my test results, and how will my family react to this news, which may also affect them?    Are my children ready to learn new information that may one day affect their own health?    Hereditary Cancer Resources    FORCE: Facing Our Risk of Cancer Empowered [email or web address]   Bright Pink bebrightpink.org   Li-Fraumeni Syndrome Association lfsassociation.org   PTEN World PTENworld.com   No stomach for cancer, Inc.  nostomachforcancer.[email or web address]   Stomach cancer relief network Scrnet.org   Collaborative Group of the Americas on Inherited Colorectal Cancer (CGA) cgaicc.com    Cancer Care cancercare.org   American Cancer Society (ACS) cancer.org   National Cancer [place] (NCI) cancer.gov     Please call us if you have any questions or concerns.   Cancer Risk Management Program [phone] ([phone])  ? Jayme Whitmore, MS [facility] [place] [phone]  ? Prema Sanz, MS, [facility] [place] [phone]  ? Lupe Solares, MS, [facility] [place]  [phone]  ? Maddie Jerrymarley, MS, [facility] [place]  [phone]  ? Beth Csear, MS, [facility] [place]  [phone]  ? Anca Dakota, MS, [facility] [place] [phone]  ? Janel Saumya, MS, [facility] [place] [phone]    References  1. Betty OVALLE, Gopi PDP, Tacos S, Melissa PADGETT, Karissa JE, Dank JL, Cara N, Samara H, Dane O, Montana A, Yamilka B, Radidonn P, Manrominakidaljit S, Bernabe DM, Bailey N, Shaina E, Charissa H, Nasim E, Sheri J, Gronkenney J, Conrad B, Jazmin H, Thorlacius S, Eerola H, Wilmar H, Trudi K, Jose OP. Average risks of breast and ovarian cancer associated with BRCA1 or BRCA2 mutations detected in case series unselected for family history: a combined analysis of 222 studies. Am J Hum Noemi. 2003;72:1117-30.  2. Katerien N, Lolly M, Vinayak G.  BRCA1 and BRCA2 Hereditary Breast and Ovarian Cancer. Gene Reviews online. 2013.  3. Rene YC, Matthew S, Lit G, Thibodeaux S. Breast cancer risk among male BRCA1 and BRCA2 mutation carriers. J Natl Cancer Inst. 2007;99:1811-4.  4. Ramy KWON, Chandra I, Gabriele J, Herbert E, Brisa ER, Dorothy F. Risk of breast cancer in male BRCA2 carriers. J Med Noemi. 2010;47:710-1.  5. National Comprehensive Cancer Network. Clinical practice guidelines in oncology, colorectal cancer screening. Available online (registration required). 2015.  6. Manuel LOVE, Beulah J, Ruthie J, Elías DELCID, Leela COSME, Eng C. Lifetime cancer risks in individuals with germline PTEN mutations. Clin Cancer Res. 2012;18:400-7.  7. Honey DEL VALLE.  Cowden Syndrome: A Critical Review of the Clinical Literature. J Noemi . 2009:18:13-27.  8. Yakelin OVALLE, Osman D, Lan S, Estefani P, Elie T, Vandana M, Nirav B, Ese H, Humza R, Silvino K, Laurita L, Ramy DG, Bernabe D, Ananth DF, Juan R MR, The Breast Cancer Susceptibility Collaboration ([place]) & Javon COOPER. ARNIE mutations that cause ataxia-telangiectasia are breast cancer susceptibility alleles. Nature Genetics. 2006;38:873-875  9. Froylan N , Travis Y, Alejandra J, Raymundo L, Troy GM , Laura ML, Gallinger S, Diego AG, Syngal S, Sanaz ML, Nilay J , Sammy R, Enrico SZ, Lynne JR, Charles VE, Julia M, Vorandy B, Janene N, Josue RH, Darcy KW, and Pancho AP. ARNIE mutations in patients with hereditary pancreatic cancer. Cancer Discover. 2012;2:41-46  10. Betty ANDERSON, et al. Breast-Cancer Risk in Families with Mutations in PALB2. NEJM. 2014; 371(6):497-506.  11. CHEK2 Breast Cancer Case-Control Consortium. CHEK2*1100delC and susceptibility to breast cancer: A collaborative analysis involving 10,860 breast cancer cases and 9,065 controls from 10 studies. Am J Hum Noemi, 74 (2004), pp. 7049-8455  12. Octavia T, Danii S, Jose K, et al. Spectrum of Mutations in BRCA1, BRCA2, CHEK2, and TP53 in Families at High Risk of Breast Cancer. JAMAICA. 2006;295(12):6907-2716.   13. Shelbie C, Raul D, Mouna A, et al. Risk of breast cancer in women with a CHEK2 mutation with and without a family history of breast cancer. J Clin Oncol. 2011;29:9091-7378.  14. Hamilton H, Shree E, Kary SJ, et al. Contribution of germline mutations in the RAD51B, RAD51C, and RAD51D genes to ovarian cancer in the population. J Clin Oncol. 2015;33(26):6813-1676. Doi:10.1200/JCO.2015.61.2408.  15. Brian T, Kami MORE, Rik P, et al. Mutations in BRIP1 confer high risk of ovarian cancer. Elisa Noemi. 2011;43(11):5516-5749. doi:10.1038/ng.955.

## 2022-05-20 ENCOUNTER — MYC MEDICAL ADVICE (OUTPATIENT)
Dept: PEDIATRICS | Facility: CLINIC | Age: 57
End: 2022-05-20
Payer: COMMERCIAL

## 2022-05-20 DIAGNOSIS — G47.00 INSOMNIA, UNSPECIFIED TYPE: Primary | ICD-10-CM

## 2022-05-23 RX ORDER — HYDROXYZINE HYDROCHLORIDE 25 MG/1
25-50 TABLET, FILM COATED ORAL
Qty: 20 TABLET | Refills: 0 | Status: SHIPPED | OUTPATIENT
Start: 2022-05-23 | End: 2022-07-01

## 2022-06-01 ENCOUNTER — MYC MEDICAL ADVICE (OUTPATIENT)
Dept: PEDIATRICS | Facility: CLINIC | Age: 57
End: 2022-06-01
Payer: COMMERCIAL

## 2022-06-24 ASSESSMENT — SLEEP AND FATIGUE QUESTIONNAIRES
HOW LIKELY ARE YOU TO NOD OFF OR FALL ASLEEP WHILE SITTING AND READING: SLIGHT CHANCE OF DOZING
HOW LIKELY ARE YOU TO NOD OFF OR FALL ASLEEP WHILE WATCHING TV: MODERATE CHANCE OF DOZING
HOW LIKELY ARE YOU TO NOD OFF OR FALL ASLEEP WHILE SITTING AND TALKING TO SOMEONE: WOULD NEVER DOZE
HOW LIKELY ARE YOU TO NOD OFF OR FALL ASLEEP WHILE LYING DOWN TO REST IN THE AFTERNOON WHEN CIRCUMSTANCES PERMIT: SLIGHT CHANCE OF DOZING
HOW LIKELY ARE YOU TO NOD OFF OR FALL ASLEEP WHILE SITTING QUIETLY AFTER LUNCH WITHOUT ALCOHOL: WOULD NEVER DOZE
HOW LIKELY ARE YOU TO NOD OFF OR FALL ASLEEP WHILE SITTING INACTIVE IN A PUBLIC PLACE: WOULD NEVER DOZE
HOW LIKELY ARE YOU TO NOD OFF OR FALL ASLEEP WHEN YOU ARE A PASSENGER IN A CAR FOR AN HOUR WITHOUT A BREAK: WOULD NEVER DOZE
HOW LIKELY ARE YOU TO NOD OFF OR FALL ASLEEP IN A CAR, WHILE STOPPED FOR A FEW MINUTES IN TRAFFIC: WOULD NEVER DOZE

## 2022-06-28 ENCOUNTER — VIRTUAL VISIT (OUTPATIENT)
Dept: SLEEP MEDICINE | Facility: CLINIC | Age: 57
End: 2022-06-28
Attending: PHYSICIAN ASSISTANT
Payer: COMMERCIAL

## 2022-06-28 VITALS — BODY MASS INDEX: 41.59 KG/M2 | WEIGHT: 265 LBS | HEIGHT: 67 IN

## 2022-06-28 DIAGNOSIS — G47.9 SLEEP DISTURBANCE: Primary | ICD-10-CM

## 2022-06-28 DIAGNOSIS — R40.0 DAYTIME SOMNOLENCE: ICD-10-CM

## 2022-06-28 DIAGNOSIS — R06.83 SNORING: ICD-10-CM

## 2022-06-28 DIAGNOSIS — E66.01 MORBID OBESITY (H): ICD-10-CM

## 2022-06-28 DIAGNOSIS — G47.00 INSOMNIA, UNSPECIFIED TYPE: ICD-10-CM

## 2022-06-28 DIAGNOSIS — R06.81 WITNESSED APNEIC SPELLS: ICD-10-CM

## 2022-06-28 PROBLEM — E89.0 H/O PARTIAL THYROIDECTOMY: Status: ACTIVE | Noted: 2019-08-29

## 2022-06-28 PROCEDURE — 99204 OFFICE O/P NEW MOD 45 MIN: CPT | Mod: 95 | Performed by: NURSE PRACTITIONER

## 2022-06-28 RX ORDER — ZOLPIDEM TARTRATE 5 MG/1
TABLET ORAL
Qty: 1 TABLET | Refills: 0 | Status: SHIPPED | OUTPATIENT
Start: 2022-06-28 | End: 2022-09-14

## 2022-06-28 NOTE — PROGRESS NOTES
Nora is a 57 year old who is being evaluated via a billable video visit.      How would you like to obtain your AVS? MyChart  If the video visit is dropped, the invitation should be resent by: Text to cell phone: [phone]  Will anyone else be joining your video visit? Kayce Byrnes        Video-Visit Details    Video Start Time: 8:33 AM    Type of service:  Video Visit    Video End Time:  9:01 AM    Originating Location (pt. Location): Home    Distant Location (provider location):  [facility]     Platform used for Video Visit: [facility]         Outpatient Sleep Medicine Consultation:      Name: Nora Og MRN# [id number]   Age: 57 year old YOB: 1965     Date of Consultation: June 28, 2022  Consultation is requested by: Jose M Montejo, ANT  [id number] [id number] Jose M Montejo  Primary care provider: Hannah Espinoza       Reason for Sleep Consult:     Nora Og is sent by Jose M Montejo for a sleep consultation regarding snoring, gasping, possible EDIS.    Patient s Reason for visit  Nora Og main reason for visit: I snore a lot and average 4 hours of sleep per night.  Patient states problem(s) started: All my life  Nora Og's goals for this visit: To determine if a have sleep apnea or not.           Assessment and Plan:     Summary Sleep Diagnoses:  1. Sleep disturbance  2. Snoring  3. Witnessed apneic spells  4. Daytime somnolence  5. Insomnia, unspecified type  6. Morbid obesity (H)  - Sleep Study Referral  - Comprehensive Sleep Study; Future  - zolpidem (AMBIEN) 5 MG tablet; Take tablet by mouth 15 minutes prior to sleep, for Sleep Study  Dispense: 1 tablet; Refill: 0    Comorbid Diagnoses:  1.  Prediabetes  2.  Anxiety  3.  Morbid obesity      Summary Recommendations:  1.  Recommend further evaluation with in lab polysomnography for possible sleep disordered breathing and RLS/PLMD.  2.  A Rx for zolpidem 5 mg  p.o. nightly x1 night of the sleep study was E scribed to the patient's pharmacy today.  Patient was instructed to bring this with him the night of the sleep test.  3.  Consider checking ferritin and iron levels at next visit.  4.  Follow-up in approximately 2 weeks after the sleep study to review the results and determine next steps.    Orders Placed This Encounter   Procedures     Comprehensive Sleep Study         Summary Counseling:    Sleep Testing Reviewed  Obstructive Sleep Apnea Reviewed  Complications of Untreated Sleep Apnea Reviewed  Previous recent chart notes and lab results reviewed    Medical Decision-making:   Educational materials provided in instructions    Total time spent reviewing medical records, history and physical examination, review of previous testing and interpretation as well as documentation on this date: 45 minutes    CC: Jose M Montejo          History of Present Illness:     Nora Og is a 57-year-old female with a PMH pertinent for prediabetes, anxiety, and morbid obesity who presents today with symptoms of snoring, waking up gasping for air, daytime somnolence, night terrors, difficulty falling/staying asleep, and poorly restful sleep for several years.  She was referred by [facility] for further evaluation of possible sleep disordered breathing.    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  Wakes up due to Snorting self awake, Use the bathroom, Anxiety, Nightmares  She has trouble falling back asleep 4 times a week.   It usually takes sometimes I don't to get back to sleep  Patient is usually up at 6am  Uses alarm: Yes    Weekends/Non-work Days/All Other  Days:  Usually gets into bed at 9-10pm   Takes patient about sometimes hours to fall asleep  Patient is usually up at 6am  Uses alarm: Yes    Sleep Need  Patient gets  4 hours sleep on average   Patient thinks she needs about 7 hours sleep    Nora Og prefers to sleep in this position(s): Side   Patient states they do the following activities in bed: Use phone, computer, or tablet    Naps  Patient takes a purposeful nap 1 times a week and naps are usually 1 hour in duration  She feels better after a nap: No  She dozes off unintentionally 0 days per week  Patient has had a driving accident or near-miss due to sleepiness/drowsiness: No      SLEEP DISRUPTIONS:    Breathing/Snoring  Patient snores:Yes  Other people complain about her snoring: Yes  Patient has been told she stops breathing in her sleep: No, wakes up gasping at times  She has issues with the following: Morning mouth dryness, Stuffy nose when you wake up, Heartburn or reflux at night, Getting up to urinate more than once    Movement:  Patient gets pain, discomfort, with an urge to move:  Yes  It happens when she is resting:  Yes  It happens more at night:  No  Patient has been told she kicks her legs at night:  Yes     Behaviors in Sleep:  Nora Og has experienced the following behaviors while sleeping: Recurring Nightmares, Sleep-talking, Teeth grinding, Kicking or punching, Night terrors (screaming,yelling or acting afraid but not recalling event)  She has experienced sudden muscle weakness during the day: No      Is there anything else you would like your sleep provider to know: No        CAFFEINE AND OTHER SUBSTANCES:    Patient consumes caffeinated beverages per day:  2  Last caffeine use is usually: 2pm  List of any prescribed or over the counter stimulants that patient takes:    List of any prescribed or over the counter sleep medication patient takes: Hydroxyzine  List of previous sleep medications that patient has tried:  Trazodone  Patient drinks alcohol to help them sleep: Yes  Patient drinks alcohol near bedtime: Yes    Alcohol use: 7-10 glasses of wine/week  Nicotine/tobacco use: None  Recreational drug use: None      Family History:  Patient has a family member been diagnosed with a sleep disorder: No            SCALES:    EPWORTH SLEEPINESS SCALE      [place] Sleepiness Scale ( AMBREEN Real  1990-1997[facility] [place]/English - Final version - 21 Nov 07 - [facility] [place].) 6/24/2022   Sitting and reading Slight chance of dozing   Watching TV Moderate chance of dozing   Sitting, inactive in a public place (e.g. a theatre or a meeting) Would never doze   As a passenger in a car for an hour without a break Would never doze   Lying down to rest in the afternoon when circumstances permit Slight chance of dozing   Sitting and talking to someone Would never doze   Sitting quietly after a lunch without alcohol Would never doze   In a car, while stopped for a few minutes in traffic Would never doze   [place] Score (MC) 4   [place] Score (Sleep) 4         INSOMNIA SEVERITY INDEX (NELY)      Insomnia Severity Index (NELY) 6/24/2022   Difficulty falling asleep 2   Difficulty staying asleep 4   Problems waking up too early 2   How SATISFIED/DISSATISFIED are you with your CURRENT sleep pattern? 4   How NOTICEABLE to others do you think your sleep problem is in terms of impairing the quality of your life? 4   How WORRIED/DISTRESSED are you about your current sleep problem? 4   To what extent do you consider your sleep problem to INTERFERE with your daily functioning (e.g. daytime fatigue, mood, ability to function at work/daily chores, concentration, memory, mood, etc.) CURRENTLY? 3   NELY Total Score 23       Guidelines for Scoring/Interpretation:  Total score categories:  0-7 = No clinically significant insomnia   8-14 = Subthreshold insomnia   15-21 = Clinical insomnia (moderate severity)  22-28 = Clinical insomnia (severe)  Used via courtesy  "of www.[facility].va.gov with permission from Kumar Camp PhD., [facility]      STOP BANG score: 4    STOP BANG Questionnaire (  2008, the American Society of Anesthesiologists, Inc. [employer] 6/28/2022   1. Snoring - Do you snore loudly (louder than talking or loud enough to be heard through closed doors)? -   2. Tired - Do you often feel tired, fatigued, or sleepy during daytime? -   3. Observed - Has anyone observed you stop breathing during your sleep? -   4. Blood pressure - Do you have or are you being treated for high blood pressure? -   5. BMI - BMI more than 35 kg/m2? -   6. Age - Age over 50 yr old? -   7. Neck circumference - Neck circumference greater than 40 cm? -   8. Gender - Gender male? -   STOP BANG Score (MC): -   B/P Clinic: -   BMI Clinic: 41.5         GAD7    No flowsheet data found.      CAGE-AID    No flowsheet data found.    CAGE-AID reprinted with permission from the [place] Medical Journal, MARCELINO Youssef and BHAVIN Umaña, \"Conjoint screening questionnaires for alcohol and drug abuse\" [place] Medical Journal 94: 135-140, 1995.      PATIENT HEALTH QUESTIONNAIRE-9 (PHQ - 9)    PHQ-9 ([employer]) 8/23/2021   1.  Little interest or pleasure in doing things 2   2.  Feeling down, depressed, or hopeless 1   3.  Trouble falling or staying asleep, or sleeping too much 3   4.  Feeling tired or having little energy 2   5.  Poor appetite or overeating 1   6.  Feeling bad about yourself 1   7.  Trouble concentrating 0   8.  Moving slowly or restless 0   9.  Suicidal or self-harm thoughts 0   PHQ-9 Total Score 10   1.  Little interest or pleasure in doing things More than half the days   2.  Feeling down, depressed, or hopeless Several days   3.  Trouble falling or staying asleep, or sleeping too much Nearly every day   4.  Feeling tired or having little energy More than half the days   5.  Poor appetite or overeating Several days   6.  Feeling bad about yourself Several " days   7.  Trouble concentrating Not at all   8.  Moving slowly or restless Not at all   9.  Suicidal or self-harm thoughts Not at all   PHQ-9 via [facility] TOTAL SCORE-----> 10 (Moderate depression)   Difficulty at work, home, or with people Somewhat difficult       Developed by Sheldon Madrigal, Carrie Tinoco, David Santos and colleagues, with an educational faizan from [employer] No permission required to reproduce, translate, display or distribute.        Allergies:    Allergies   Allergen Reactions     Gluten Meal      intolerance     Mold Cough and Other (See Comments)     Monosodium Glutamate GI Disturbance, Headache and Nausea and Vomiting     Pollen Extract Cough and Other (See Comments)     Seasonal allergies     Bupropion Anxiety       Medications:    Current Outpatient Medications   Medication Sig Dispense Refill     zolpidem (AMBIEN) 5 MG tablet Take tablet by mouth 15 minutes prior to sleep, for Sleep Study 1 tablet 0     busPIRone (BUSPAR) 10 MG tablet Take 1 tablet (10 mg) by mouth 2 times daily 180 tablet 3     hydrOXYzine (ATARAX) 25 MG tablet Take 1-2 tablets (25-50 mg) by mouth nightly as needed for anxiety 20 tablet 0     PARoxetine (PAXIL) 30 MG tablet Take 1 tablet (30 mg) by mouth every morning 90 tablet 2     vitamin D3 (CHOLECALCIFEROL) 125 MCG (5000 UT) tablet          Problem List:  Patient Active Problem List    Diagnosis Date Noted     Prediabetes 03/09/2022     Priority: Medium     Anxiety 08/26/2021     Priority: Medium     Morbid obesity (H) 08/26/2021     Priority: Medium     H/O partial thyroidectomy 08/29/2019     Priority: Medium        Past Medical/Surgical History:  Past Medical History:   Diagnosis Date     Fracture of right shoulder 2018     Fracture of right wrist      HAYES (generalized anxiety disorder)      Moderate episode of recurrent major depressive disorder (H)      Past Surgical History:   Procedure Laterality Date     DILATION AND CURETTAGE  2016      THYROIDECTOMY   2016    Partial, doesn't require meds       Social History:  Social History     Socioeconomic History     Marital status:      Spouse name: Not on file     Number of children: Not on file     Years of education: Not on file     Highest education level: Not on file   Occupational History     Not on file   Tobacco Use     Smoking status: Former Smoker     Types: Cigarettes     Quit date:      Years since quittin.4     Smokeless tobacco: Never Used     Tobacco comment: Social smoker only   Substance and Sexual Activity     Alcohol use: Yes     Alcohol/week: 7.0 standard drinks     Types: 7 Glasses of wine per week     Drug use: No     Sexual activity: Yes     Partners: Male   Other Topics Concern     Not on file   Social History Narrative    Moved to [place] 3 years ago, had been living in [facility] prior, is from [place].     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         Hannah Espinoza, DNP, APRN, CNP    21     Social Determinants of Health     Financial Resource Strain: Low Risk      Difficulty of Paying Living Expenses: Not hard at all   Food Insecurity: No Food Insecurity     Worried About Running Out of Food in the Last Year: Never true     Ran Out of Food in the Last Year: Never true   Transportation Needs: No Transportation Needs     Lack of Transportation (Medical): No     Lack of Transportation (Non-Medical): No   Physical Activity: Insufficiently Active     Days of Exercise per Week: 3 days     Minutes of Exercise per Session: 30 min   Stress: Stress Concern Present     Feeling of Stress : Rather much   Social Connections: Socially Integrated     Frequency of Communication with Friends and Family: More than three times a week     Frequency of Social Gatherings with Friends and Family: Once a week     Attends Alevism Services: 1 to 4 times per year     Active Member of Clubs or Organizations: Yes     Attends Club or Organization Meetings: Not on file     Marital Status:   "  Intimate Partner Violence: Not on file   Housing Stability: Low Risk      Unable to Pay for Housing in the Last Year: No     Number of Places Lived in the Last Year: 1     Unstable Housing in the Last Year: No       Family History:  Family History   Problem Relation Age of Onset     Heart Disease Mother      Other - See Comments Mother         Lumpectomy     Heart Disease Father      Heart Surgery Father         Triple     No Known Problems Sister      No Known Problems Sister      No Known Problems Sister      No Known Problems Sister        Review of Systems:  A complete review of systems reviewed by me is negative with the exeption of what has been mentioned in the history of present illness.  In the last TWO WEEKS have you experienced any of the following symptoms?  Fevers: No  Night Sweats: Yes  Weight Gain: No  Pain at Night: Yes  Double Vision: No  Changes in Vision: No  Difficulty Breathing through Nose: No  Sore Throat in Morning: Yes  Dry Mouth in the Morning: Yes  Shortness of Breath Lying Flat: No  Shortness of Breath With Activity: Yes  Awakening with Shortness of Breath: No  Increased Cough: Yes  Heart Racing at Night: Yes  Swelling in Feet or Legs: No  Diarrhea at Night: No  Heartburn at Night: Yes  Urinating More than Once at Night: Yes  Losing Control of Urine at Night: No  Joint Pains at Night: Yes  Headaches in Morning: No  Weakness in Arms or Legs: No  Depressed Mood: No  Anxiety: Yes     Physical Examination:  Vitals: Ht 1.702 m (5' 7\")   Wt 120.2 kg (265 lb)   BMI 41.50 kg/m    BMI= Body mass index is 41.5 kg/m .           GENERAL APPEARANCE: healthy, alert, no distress and cooperative  EYES: Eyes grossly normal to inspection  RESP: Unlabored, easy breathing with normal conversational speech  CV: color normal  NEURO: Alert and oriented x3, mentation intact and speech normal  PSYCH: mentation appears normal and affect normal/bright  Mallampati Class: Not examined  Tonsillar Stage: Not " examined         Data: All pertinent previous laboratory data reviewed     Recent Labs   Lab Test 01/13/22  0805 12/29/18  1609 12/28/18  1302    140 139   POTASSIUM 4.6 3.9 4.0   CHLORIDE 100 106 105   CO2  --  25 25   ANIONGAP  --  9 9   * 90 100*   BUN 12  14 17 13   CR 0.83 1.05* 0.84   MARQUIS 9.6 8.9 8.8       Recent Labs   Lab Test 12/29/18  1609   WBC 4.8   RBC 4.32   HGB 13.5   HCT 40.3   MCV 93   MCH 31.3   MCHC 33.5   RDW 12.5          Recent Labs   Lab Test 01/13/22  0805   PROTTOTAL 7.3   ALBUMIN 4.5   BILITOTAL 0.6   ALKPHOS 85   AST 27   ALT 52*       No results found for: TSH    No results found for: UAMP, UBARB, BENZODIAZEUR, UCANN, UCOC, OPIT, UPCP    No results found for: IRONSAT, [id number], CLAY    No results found for: PH, PHARTERIAL, PO2, [id number], SAT, PCO2, HCO3, BASEEXCESS, PORTILLO, BEB    @LABRCNTIPR(phv:4,pco2v:4,po2v:4,hco3v:4,kim:4,o2per:4)@    Echocardiology: No results found for this or any previous visit (from the past 4320 hour(s)).    Chest x-ray: No results found for this or any previous visit from the past 365 days.      Chest CT: No results found for this or any previous visit from the past 365 days.      PFT: Most Recent [facility]ze Pulmonary Function Testing    No results found for: 20001  No results found for: 20002  No results found for: 20003  No results found for: 20015  No results found for: 20016  No results found for: 20027  No results found for: 20028  No results found for: 20029  No results found for: 20079  No results found for: 20080  No results found for: 20081  No results found for: 20335  No results found for: 20105  No results found for: 20053  No results found for: 20054  No results found for: 20055      ABHINAV Rivera CNP 6/28/2022   Sleep Medicine      This note was written with the assistance of the Dragon voice-dictation technology software. The final document, although reviewed, may contain errors. For corrections, please contact the  office.

## 2022-06-28 NOTE — PATIENT INSTRUCTIONS
"      MY TREATMENT INFORMATION FOR SLEEP APNEA-  Nora Og    DOCTOR : ABHINAV Rivera CNP    Am I having a sleep study at a sleep center?  --->Due to normal delays, you will be contacted within 2-4 weeks to schedule    Am I having a home sleep study?  --->Watch the video for the device you are using:    -/drop off device-   https://www.[employer].com/watch?v=yGGFBdELGhk    -Disposable device sent out require phone/computer application-   https://www.[employer].com/watch?v=BCce_vbiwxE      Frequently asked questions:  1. What is Obstructive Sleep Apnea (EDIS)? EDIS is the most common type of sleep apnea. Apnea means, \"without breath.\"  Apnea is most often caused by narrowing or collapse of the upper airway as muscles relax during sleep.   Almost everyone has occasional apneas. Most people with sleep apnea have had brief interruptions at night frequently for many years.  The severity of sleep apnea is related to how frequent and severe the events are.   2. What are the consequences of EDIS? Symptoms include: feeling sleepy during the day, snoring loudly, gasping or stopping of breathing, trouble sleeping, and occasionally morning headaches or heartburn at night.  Sleepiness can be serious and even increase the risk of falling asleep while driving. Other health consequences may include development of high blood pressure and other cardiovascular disease in persons who are susceptible. Untreated EDIS  can contribute to heart disease, stroke and diabetes.   3. What are the treatment options? In most situations, sleep apnea is a lifelong disease that must be managed with daily therapy. Medications are not effective for sleep apnea and surgery is generally not considered until other therapies have been tried. Your treatment is your choice . Continuous Positive Airway (CPAP) works right away and is the therapy that is effective in nearly everyone. An oral device to hold your jaw forward is usually the next most " reliable option. Other options include postioning devices (to keep you off your back), weight loss, and surgery including a tongue pacing device. There is more detail about some of these options below.  4. Are my sleep studies covered by insurance? Although we will request verification of coverage, we advise you also check in advance of the study to ensure there is coverage.    Important tips for those choosing CPAP and similar devices   Know your equipment:  CPAP is continuous positive airway pressure that prevents obstructive sleep apnea by keeping the throat from collapsing while you are sleeping. In most cases, the device is  smart  and can slowly self-adjusts if your throat collapses and keeps a record every day of how well you are treated-this information is available to you and your care team.  BPAP is bilevel positive airway pressure that keeps your throat open and also assists each breath with a pressure boost to maintain adequate breathing.  Special kinds of BPAP are used in patients who have inadequate breathing from lung or heart disease. In most cases, the device is  smart  and can slowly self-adjusts to assist breathing. Like CPAP, the device keeps a record of how well you are treated.  Your mask is your connection to the device. You get to choose what feels most comfortable and the staff will help to make sure if fits. Here: are some examples of the different masks that are available:       Key points to remember on your journey with sleep apnea:  Sleep study.  PAP devices often need to be adjusted during a sleep study to show that they are effective and adjusted right.  Good tips to remember: Try wearing just the mask during a quiet time during the day so your body adapts to wearing it. A humidifier is recommended for comfort in most cases to prevent drying of your nose and throat. Allergy medication from your provider may help you if you are having nasal congestion.  Getting settled-in. It takes  more than one night for most of us to get used to wearing a mask. Try wearing just the mask during a quiet time during the day so your body adapts to wearing it. A humidifier is recommended for comfort in most cases. Our team will work with you carefully on the first day and will be in contact within 4 days and again at 2 and 4 weeks for advice and remote device adjustments. Your therapy is evaluated by the device each day.   Use it every night. The more you are able to sleep naturally for 7-8 hours, the more likely you will have good sleep and to prevent health risks or symptoms from sleep apnea. Even if you use it 4 hours it helps. Occasionally all of us are unable to use a medical therapy, in sleep apnea, it is not dangerous to miss one night.   Communicate. Call our skilled team on the number provided on the first day if your visit for problems that make it difficult to wear the device. Over 2 out of 3 patients can learn to wear the device long-term with help from our team. Remember to call our team or your sleep providers if you are unable to wear the device as we may have other solutions for those who cannot adapt to mask CPAP therapy. It is recommended that you sleep your sleep provider within the first 3 months and yearly after that if you are not having problems.   Use it for your health. We encourage use of CPAP masks during daytime quiet periods to allow your face and brain to adapt to the sensation of CPAP so that it will be a more natural sensation to awaken to at night or during naps. This can be very useful during the first few weeks or months of adapting to CPAP though it does not help medically to wear CPAP during wakefulness and  should not be used as a strategy just to meet guidelines.  Take care of your equipment. Make sure you clean your mask and tubing using directions every day and that your filter and mask are replaced as recommended or if they are not working.     BESIDES CPAP, WHAT OTHER  THERAPIES ARE THERE?    Positioning Device  Positioning devices are generally used when sleep apnea is mild and only occurs on your back.This example shows a pillow that straps around the waist. It may be appropriate for those whose sleep study shows milder sleep apnea that occurs primarily when lying flat on one's back. Preliminary studies have shown benefit but effectiveness at home may need to be verified by a home sleep test. These devices are generally not covered by medical insurance.  Examples of devices that maintain sleeping on the back to prevent snoring and mild sleep apnea.    Belt type body positioner  http://[employer]/    Electronic reminder  http://nightshifttherapy.com/  http://www.[employer].au/      Oral Appliance  What is oral appliance therapy?  An oral appliance device fits on your teeth at night like a retainer used after having braces. The device is made by a specialized dentist and requires several visits over 1-2 months before a manufactured device is made to fit your teeth and is adjusted to prevent your sleep apnea. Once an oral device is working properly, snoring should be improved. A home sleep test may be recommended at that time if to determine whether the sleep apnea is adequately treated.       Some things to remember:  -Oral devices are often, but not always, covered by your medical insurance. Be sure to check with your insurance provider.   -If you are referred for oral therapy, you will be given a list of specialized dentists to consider or you may choose to visit the Web site of the American Academy of Dental Sleep Medicine  -Oral devices are less likely to work if you have severe sleep apnea or are extremely overweight.     More detailed information  An oral appliance is a small acrylic device that fits over the upper and lower teeth  (similar to a retainer or a mouth guard). This device slightly moves jaw forward, which moves the base of the tongue forward, opens the  airway, improves breathing for effective treat snoring and obstructive sleep apnea in perhaps 7 out of 10 people .  The best working devices are custom-made by a dental device  after a mold is made of the teeth 1, 2, 3.  When is an oral appliance indicated?  Oral appliance therapy is recommended as a first-line treatment for patients with primary snoring, mild sleep apnea, and for patients with moderate sleep apnea who prefer appliance therapy to use of CPAP4, 5. Severity of sleep apnea is determined by sleep testing and is based on the number of respiratory events per hour of sleep.   How successful is oral appliance therapy?  The success rate of oral appliance therapy in patients with mild sleep apnea is 75-80% while in patients with moderate sleep apnea it is 50-70%. The chance of success in patients with severe sleep apnea is 40-50%. The research also shows that oral appliances have a beneficial effect on the cardiovascular health of EDIS patients at the same magnitude as CPAP therapy7.  Oral appliances should be a second-line treatment in cases of severe sleep apnea, but if not completely successful then a combination therapy utilizing CPAP plus oral appliance therapy may be effective. Oral appliances tend to be effective in a broad range of patients although studies show that the patients who have the highest success are females, younger patients, those with milder disease, and less severe obesity. 3, 6.   Finding a dentist that practices dental sleep medicine  Specific training is available through the American Academy of Dental Sleep Medicine for dentists interested in working in the field of sleep. To find a dentist who is educated in the field of sleep and the use of oral appliances, near you, visit the Web site of the American Academy of Dental Sleep Medicine.    References  1. joe Bowles al. Objectively measured vs self-reported compliance during oral appliance therapy for sleep-disordered  breathing. Chest 2013; 144(5): 0263-1891.  2. [place], et al. Objective measurement of compliance during oral appliance therapy for sleep-disordered breathing. Thorax 2013; 68(1): 91-96.  3. Paris et al. Mandibular advancement devices in 620 men and women with EDIS and snoring: tolerability and predictors of treatment success. Chest 2004; 125: 8787-9127.  4. Aliza et al. Oral appliances for snoring and EDIS: a review. Sleep 2006; 29: 244-262.  5. Juancho et al. Oral appliance treatment for EDIS: an update. J Clin Sleep Med 2014; 10(2): 215-227.  6. Brodie et al. Predictors of OSAH treatment outcome. J Dent Res 2007; 86: 9752-3636.      Weight Loss:    Weight loss is a long-term strategy that may improve sleep apnea in some patients.    Weight management is a personal decision and the decision should be based on your interest and the potential benefits.  If you are interested in exploring weight loss strategies, the following discussion covers the impact on weight loss on sleep apnea and the approaches that may be successful.    Being overweight does not necessarily mean you will have health consequences.  Those who have BMI over 35 or over 27 with existing medical conditions carries greater risk.   Weight loss decreases severity of sleep apnea in most people with obesity. For those with mild obesity who have developed snoring with weight gain, even 15-30 pound weight loss can improve and occasionally eliminate sleep apnea.  Structured and life-long dietary and health habits are necessary to lose weight and keep healthier weight levels.     Though there may be significant health benefits from weight loss, long-term weight loss is very difficult to achieve- studies show success with dietary management in less than 10% of people. In addition, substantial weight loss may require years of dietary control and may be difficult if patients have severe obesity. In these cases, surgical management may be  considered.  Finally, older individuals who have tolerated obesity without health complications may be less likely to benefit from weight loss strategies.      Your BMI is Body mass index is 41.5 kg/m .    What is BMI?  Body mass index (BMI) is one way to tell whether you are at a healthy weight, overweight, or obese. It measures your weight in relation to your height.  A BMI of 18.5 to 24.9 is in the healthy range. A person with a BMI of 25 to 29.9 is considered overweight, and someone with a BMI of 30 or greater is considered obese.  Another way to find out if you are at risk for health problems caused by overweight and obesity is to measure your waist. If you are a woman and your waist is more than 35 inches, or if you are a man and your waist is more than 40 inches, your risk of disease may be higher.  More than two-thirds of American adults are considered overweight or obese. Being overweight or obese increases the risk for further weight gain.  Excess weight may lead to heart disease and diabetes. Creating and following plans for healthy eating and physical activity may help you improve your health.    Methods for maintaining or losing weight.  Weight control is part of healthy lifestyle and includes exercise, emotional health, and healthy eating habits.  Careful eating habits lifelong is the mainstay of weight control.  Though there are significant health benefits from weight loss, long-term weight loss with diet alone may be very difficult to achieve- studies show long-term success with dietary management in less than 10% of people. Attaining a healthy weight may be especially difficult to achieve in those with severe obesity. In some cases, medications, devices and surgical management might be considered.    What can you do?  If you are overweight or obese and are interested in methods for weight loss, you should discuss this with your provider. In addition, we recommend that you review healthy life styles  and methods for weight loss available through the National Institutes of Health patient information sites:   http://win.niddk.nih.gov/publications/index.htm    Surgery:    Surgery for obstructive sleep apnea is considered generally only when other therapies fail to work. Surgery may be discussed with you if you are having a difficult time tolerating CPAP and or when there is an abnormal structure that requires surgical correction.  Nose and throat surgeries often enlarge the airway to prevent collapse.  Most of these surgeries create pain for 1-2 weeks and up to half of the most common surgeries are not effective throughout life.  You should carefully discuss the benefits and drawbacks to surgery with your sleep provider and surgeon to determine if it is the best solution for you.   More information  Surgery for EDIS is directed at areas that are responsible for narrowing or complete obstruction of the airway during sleep.  There are a wide range of procedures available to enlarge and/or stabilize the airway to prevent blockage of breathing in the three major areas where it can occur: the palate, tongue, and nasal regions.  Successful surgical treatment depends on the accurate identification of the factors responsible for obstructive sleep apnea in each person.  A personalized approach is required because there is no single treatment that works well for everyone.  Because of anatomic variation, consultation with an examination by a sleep surgeon is a critical first step in determining what surgical options are best for each patient.  In some cases, examination during sedation may be recommended in order to guide the selection of procedures.  Patients will be counseled about risks and benefits as well as the typical recovery course after surgery. Surgery is typically not a cure for a person s EDIS.  However, surgery will often significantly improve one s EDIS severity (termed  success rate ).  Even in the absence of a  cure, surgery will decrease the cardiovascular risk associated with OSA7; improve overall quality of life8 (sleepiness, functionality, sleep quality, etc).      Palate Procedures:  Patients with EDIS often have narrowing of their airway in the region of their tonsils and uvula.  The goals of palate procedures are to widen the airway in this region as well as to help the tissues resist collapse.  Modern palate procedure techniques focus on tissue conservation and soft tissue rearrangement, rather than tissue removal.  Often the uvula is preserved in this procedure. Residual sleep apnea is common in patient after pharyngoplasty with an average reduction in sleep apnea events of 33%2.      Tongue Procedures:  ExamWhile patients are awake, the muscles that surround the throat are active and keep this region open for breathing. These muscles relax during sleep, allowing the tongue and other structures to collapse and block breathing.  There are several different tongue procedures available.  Selection of a tongue base procedure depends on characteristics seen on physical exam.  Generally, procedures are aimed at removing bulky tissues in this area or preventing the back of the tongue from falling back during sleep.  Success rates for tongue surgery range from 50-62%3.    Hypoglossal Nerve Stimulation:  Hypoglossal nerve stimulation has recently received approval from the [place] Food and Drug Administration for the treatment of obstructive sleep apnea.  This is based on research showing that the system was safe and effective in treating sleep apnea6.  Results showed that the median AHI score decreased 68%, from 29.3 to 9.0. This therapy uses an implant system that senses breathing patterns and delivers mild stimulation to airway muscles, which keeps the airway open during sleep.  The system consists of three fully implanted components: a small generator (similar in size to a pacemaker), a breathing sensor, and a  stimulation lead.  Using a small handheld remote, a patient turns the therapy on before bed and off upon awakening.    Candidates for this device must be greater than 22 years of age, have moderate to severe EDIS (AHI between 20-65), BMI less than 32, have tried CPAP/oral appliance without success, and have appropriate upper airway anatomy (determined by a sleep endoscopy performed by Dr. Elmore).    Hypoglossal Nerve Stimulation Pathway:    The sleep surgeon s office will work with the patient through the insurance prior-authorization process (including communications and appeals).    Nasal Procedures:  Nasal obstruction can interfere with nasal breathing during the day and night.  Studies have shown that relief of nasal obstruction can improve the ability of some patients to tolerate positive airway pressure therapy for obstructive sleep apnea1.  Treatment options include medications such as nasal saline, topical corticosteroid and antihistamine sprays, and oral medications such as antihistamines or decongestants. Non-surgical treatments can include external nasal dilators for selected patients. If these are not successful by themselves, surgery can improve the nasal airway either alone or in combination with these other options.      Combination Procedures:  Combination of surgical procedures and other treatments may be recommended, particularly if patients have more than one area of narrowing or persistent positional disease.  The success rate of combination surgery ranges from 66-80%2,3.    References  Nini LOZADA. The Role of the Nose in Snoring and Obstructive Sleep Apnoea: An Update.  Eur Arch Otorhinolaryngol. 2011; 268: 1365-73.   Remington SM; Keily JA; Paola JR; Pallanch JF; Martine MB; Jimmy SG; Rusty KLEIN. Surgical modifications of the upper airway for obstructive sleep apnea in adults: a systematic review and meta-analysis. SLEEP 2010;33(10):7276-4933. Russell SCHWARTZ. Hypopharyngeal surgery in obstructive  sleep apnea: an evidence-based medicine review.  Arch Otolaryngol Head Neck Surg. 2006 Feb;132(2):206-13.  Jesse YH1, Yudith Y, Misha JUDE. The efficacy of anatomically based multilevel surgery for obstructive sleep apnea. Otolaryngol Head Neck Surg. 2003 Oct;129(4):327-35.  Russell SCHWARTZ, Goldberg A. Hypopharyngeal Surgery in Obstructive Sleep Apnea: An Evidence-Based Medicine Review. Arch Otolaryngol Head Neck Surg. 2006 Feb;132(2):206-13.  Sharron LOMELI et al. Upper-Airway Stimulation for Obstructive Sleep Apnea.  N Engl J Med. 2014 Jan 9;370(2):139-49.  Elza Y et al. Increased Incidence of Cardiovascular Disease in Middle-aged Men with Obstructive Sleep Apnea. Am J Respir Crit Care Med; 2002 166: 159-165  Raphaelallan BLACK et al. Studying Life Effects and Effectiveness of Palatopharyngoplasty (SLEEP) study: Subjective Outcomes of Isolated Uvulopalatopharyngoplasty. Otolaryngol Head Neck Surg. 2011; 144: 623-631.        WHAT IF I ONLY HAVE SNORING?    Mandibular advancement devices, lateral sleep positioning, long-term weight loss and treatment of nasal allergies have been shown to improve snoring.  Exercising tongue muscles with a game (https://www.ncbi.nlm.nih.gov/pubmed/43818982) or stimulating the tongue during the day with a device (https://doi.org/10.3390/dmx77483768) have improved snoring in some individuals.    Remember to Drive Safe... Drive Alive     Sleep health profoundly affects your health, mood, and your safety.  Thirty three percent of the population (one in three of us) is not getting enough sleep and many have a sleep disorder. Not getting enough sleep or having an untreated / undertreated sleep condition may make us sleepy without even knowing it. In fact, our driving could be dramatically impaired due to our sleep health. As your provider, here are some things I would like you to know about driving:     Here are some warning signs for impairment and dangerous drowsy driving:              -Having been awake  more than 16 hours               -Looking tired               -Eyelid drooping              -Head nodding (it could be too late at this point)              -Driving for more than 30 minutes     Some things you could do to make the driving safer if you are experiencing some drowsiness:              -Stop driving and rest              -Call for transportation              -Make sure your sleep disorder is adequately treated     Some things that have been shown NOT to work when experiencing drowsiness while driving:              -Turning on the radio              -Opening windows              -Eating any  distracting  /  entertaining  foods (e.g., sunflower seeds, candy, or any other)              -Talking on the phone      Your decision may not only impact your life, but also the life of others. Please, remember to drive safe for yourself and all of us.

## 2022-06-29 ENCOUNTER — TELEPHONE (OUTPATIENT)
Dept: SLEEP MEDICINE | Facility: CLINIC | Age: 57
End: 2022-06-29

## 2022-07-01 ENCOUNTER — MYC MEDICAL ADVICE (OUTPATIENT)
Dept: PEDIATRICS | Facility: CLINIC | Age: 57
End: 2022-07-01

## 2022-07-01 ENCOUNTER — MYC REFILL (OUTPATIENT)
Dept: PEDIATRICS | Facility: CLINIC | Age: 57
End: 2022-07-01

## 2022-07-01 DIAGNOSIS — G47.00 INSOMNIA, UNSPECIFIED TYPE: ICD-10-CM

## 2022-07-01 DIAGNOSIS — G47.9 SLEEP DISTURBANCE: Primary | ICD-10-CM

## 2022-07-06 RX ORDER — HYDROXYZINE HYDROCHLORIDE 25 MG/1
25-50 TABLET, FILM COATED ORAL
Qty: 20 TABLET | Refills: 0 | Status: SHIPPED | OUTPATIENT
Start: 2022-07-06 | End: 2022-11-18

## 2022-07-06 NOTE — TELEPHONE ENCOUNTER
Prescription approved per [facility] Refill Protocol.  Madelyn CONTRERAS RN, BSN

## 2022-07-08 DIAGNOSIS — R53.83 FATIGUE: Primary | ICD-10-CM

## 2022-07-08 PROCEDURE — 36415 COLL VENOUS BLD VENIPUNCTURE: CPT | Performed by: FAMILY MEDICINE

## 2022-07-08 NOTE — NURSING NOTE
Labs were drawn today by JAMISON and results were CC'd to ordering provider Hannah Espinoza APRN CNP

## 2022-07-09 LAB
FERRITIN SERPL-MCNC: 276 NG/ML (ref 15–150)
IRON BINDING CAP: 435 UG/DL (ref 250–450)
IRON SATURATION: 22 % (ref 15–55)
IRON: 97 UG/DL (ref 27–159)
UIBC: 338 UG/DL (ref 131–425)
VIT B12 SERPL-MCNC: 251 PG/ML (ref 232–1245)
VITAMIN D, 25-HYDROXY: 46.9 NG/ML (ref 30–100)

## 2022-07-13 DIAGNOSIS — R79.89 ELEVATED FERRITIN: Primary | ICD-10-CM

## 2022-07-20 ENCOUNTER — OFFICE VISIT (OUTPATIENT)
Dept: FAMILY MEDICINE | Facility: CLINIC | Age: 57
End: 2022-07-20
Attending: NURSE PRACTITIONER
Payer: COMMERCIAL

## 2022-07-20 DIAGNOSIS — L81.4 LENTIGINES: ICD-10-CM

## 2022-07-20 DIAGNOSIS — D22.9 MULTIPLE BENIGN NEVI: Primary | ICD-10-CM

## 2022-07-20 DIAGNOSIS — L82.1 SEBORRHEIC KERATOSES: ICD-10-CM

## 2022-07-20 DIAGNOSIS — L82.0 INFLAMED SEBORRHEIC KERATOSIS: ICD-10-CM

## 2022-07-20 DIAGNOSIS — D18.01 CHERRY ANGIOMA: ICD-10-CM

## 2022-07-20 DIAGNOSIS — Z12.83 ENCOUNTER FOR SCREENING FOR MALIGNANT NEOPLASM OF SKIN: ICD-10-CM

## 2022-07-20 PROCEDURE — 99203 OFFICE O/P NEW LOW 30 MIN: CPT | Mod: 25 | Performed by: NURSE PRACTITIONER

## 2022-07-20 PROCEDURE — 17110 DESTRUCTION B9 LES UP TO 14: CPT | Performed by: NURSE PRACTITIONER

## 2022-07-20 ASSESSMENT — PAIN SCALES - GENERAL: PAINLEVEL: NO PAIN (0)

## 2022-07-20 NOTE — PROGRESS NOTES
[facility] Dermatology Note  Encounter Date: Jul 20, 2022  Office Visit     Reviewed patients past medical history and pertinent chart review prior to patients visit today.     Dermatology Problem List:  Irritated Seborrheic keratosis treated with cryotherapy 07/20/22     ____________________________________________    Assessment & Plan:     # Irritated and inflamed Seborrheic Keratosis. Discussed treatment options with patient including no treatment, cryotherapy, and shave removal. Patient prefers cryotherapy today due to irritation and itching. After verbal consent and discussion of risks and benefits including but no limited to dyspigmentation/scar, blister, and pain, 4 was(were) treated with 1-2mm freeze border for 2 cycles with liquid nitrogen. Post cryotherapy instructions were provided.         # Benign skin findings including: seborrheic keratoses, cherry angioma, lentigines and benign nevi.   - No further intervention required. Patient to report changes.   - Patient reassured of the benign nature of these lesions.    #Signs and Symptoms of non-melanoma skin cancer and ABCDEs of melanoma reviewed with patient. Patient encouraged to perform monthly self skin exams and educated on how to perform them. UV precautions reviewed with patient. Patient was asked about new or changing moles/lesions on body.     #Reviewed Sunscreen: Apply 20 minutes prior to going outdoors and reapply every two hours, when wet or sweating. We recommend using an SPF 30 or higher, and to use one that is water resistant.       Follow-up:  1-2 years for follow up full body skin exam, prn for new or changing lesions or new concerns    Yajaira Mujica, ABHINAV CNP on 7/20/2022 at 3:58 PM    ____________________________________________    CC: Skin Check    HPI:  Ms. Nora Og is a(n) 57 year old female who presents today as a new patient for a full body skin cancer screening. Patient has concerns today about some  irritated spots on her left neck and her right thigh.  They rub on her close and jewelry and get irritated.  She has no concerns about malignancy today.    Patient is otherwise feeling well, without additional skin concerns.     Physical Exam:  Vitals: Breastfeeding No   SKIN: Total skin excluding the genitalia areas was performed. The exam included the head/face, neck, both arms, chest, back, abdomen, both legs, digits, mons pubis, buttock and nails.   -Brown crusted waxy papules on the left neck x3 and on the right  medial thigh (ISK)  -several 1-2mm red dome shaped symmetric papules scattered on the trunk  -multiple tan/brown flat round macules and raised papules scattered throughout trunk, extremities and head. No worrisome features for malignancy noted on examination.  -scattered tan, homogenous macules scattered on sun exposed areas of trunk, extremities and face.   -scattered waxy, stuck on tan/brown papules and patches on the trunk     - No other lesions of concern on areas examined.     Medications:  Current Outpatient Medications   Medication     busPIRone (BUSPAR) 10 MG tablet     hydrOXYzine (ATARAX) 25 MG tablet     PARoxetine (PAXIL) 30 MG tablet     vitamin D3 (CHOLECALCIFEROL) 125 MCG (5000 UT) tablet     zolpidem (AMBIEN) 5 MG tablet     No current facility-administered medications for this visit.      Past Medical History:   Patient Active Problem List   Diagnosis     Anxiety     Morbid obesity (H)     Prediabetes     H/O partial thyroidectomy     Past Medical History:   Diagnosis Date     Fracture of right shoulder 2018     Fracture of right wrist      HAYES (generalized anxiety disorder)      Moderate episode of recurrent major depressive disorder (H)        ABHINAV Trujillo CNP  [address] [place] [address] on close of this encounter.

## 2022-07-20 NOTE — LETTER
7/20/2022         RE: Nora Og  [address] [place] [address]        Dear Colleague,    Thank you for referring your patient, Nora Og, to the [facility]. Please see a copy of my visit note below.    [facility] Dermatology Note  Encounter Date: Jul 20, 2022  Office Visit     Reviewed patients past medical history and pertinent chart review prior to patients visit today.     Dermatology Problem List:  Irritated Seborrheic keratosis treated with cryotherapy 07/20/22     ____________________________________________    Assessment & Plan:     # Irritated and inflamed Seborrheic Keratosis. Discussed treatment options with patient including no treatment, cryotherapy, and shave removal. Patient prefers cryotherapy today due to irritation and itching. After verbal consent and discussion of risks and benefits including but no limited to dyspigmentation/scar, blister, and pain, 4 was(were) treated with 1-2mm freeze border for 2 cycles with liquid nitrogen. Post cryotherapy instructions were provided.         # Benign skin findings including: seborrheic keratoses, cherry angioma, lentigines and benign nevi.   - No further intervention required. Patient to report changes.   - Patient reassured of the benign nature of these lesions.    #Signs and Symptoms of non-melanoma skin cancer and ABCDEs of melanoma reviewed with patient. Patient encouraged to perform monthly self skin exams and educated on how to perform them. UV precautions reviewed with patient. Patient was asked about new or changing moles/lesions on body.     #Reviewed Sunscreen: Apply 20 minutes prior to going outdoors and reapply every two hours, when wet or sweating. We recommend using an SPF 30 or higher, and to use one that is water resistant.       Follow-up:  1-2 years for follow up full body skin exam, prn for new or changing lesions or new concerns    Yajaira Mujica, ABHINAV CNP on 7/20/2022 at  3:58 PM    ____________________________________________    CC: Skin Check    HPI:  Ms. oNra Og is a(n) 57 year old female who presents today as a new patient for a full body skin cancer screening. Patient has concerns today about some irritated spots on her left neck and her right thigh.  They rub on her close and jewelry and get irritated.  She has no concerns about malignancy today.    Patient is otherwise feeling well, without additional skin concerns.     Physical Exam:  Vitals: Breastfeeding No   SKIN: Total skin excluding the genitalia areas was performed. The exam included the head/face, neck, both arms, chest, back, abdomen, both legs, digits, mons pubis, buttock and nails.   -Brown crusted waxy papules on the left neck x3 and on the right  medial thigh (ISK)  -several 1-2mm red dome shaped symmetric papules scattered on the trunk  -multiple tan/brown flat round macules and raised papules scattered throughout trunk, extremities and head. No worrisome features for malignancy noted on examination.  -scattered tan, homogenous macules scattered on sun exposed areas of trunk, extremities and face.   -scattered waxy, stuck on tan/brown papules and patches on the trunk     - No other lesions of concern on areas examined.     Medications:  Current Outpatient Medications   Medication     busPIRone (BUSPAR) 10 MG tablet     hydrOXYzine (ATARAX) 25 MG tablet     PARoxetine (PAXIL) 30 MG tablet     vitamin D3 (CHOLECALCIFEROL) 125 MCG (5000 UT) tablet     zolpidem (AMBIEN) 5 MG tablet     No current facility-administered medications for this visit.      Past Medical History:   Patient Active Problem List   Diagnosis     Anxiety     Morbid obesity (H)     Prediabetes     H/O partial thyroidectomy     Past Medical History:   Diagnosis Date     Fracture of right shoulder 2018     Fracture of right wrist      HAYES (generalized anxiety disorder)      Moderate episode of recurrent major depressive disorder (H)        CC  Hannah Espinoza, APRN CNP  [address] [place] [place] [address] on close of this encounter.      Again, thank you for allowing me to participate in the care of your patient.        Sincerely,        ABHINAV Arriaza CNP

## 2022-08-25 ENCOUNTER — THERAPY VISIT (OUTPATIENT)
Dept: SLEEP MEDICINE | Facility: CLINIC | Age: 57
End: 2022-08-25
Payer: COMMERCIAL

## 2022-08-25 DIAGNOSIS — R40.0 DAYTIME SOMNOLENCE: ICD-10-CM

## 2022-08-25 DIAGNOSIS — R06.83 SNORING: ICD-10-CM

## 2022-08-25 DIAGNOSIS — G47.9 SLEEP DISTURBANCE: ICD-10-CM

## 2022-08-25 DIAGNOSIS — E66.01 MORBID OBESITY (H): ICD-10-CM

## 2022-08-25 DIAGNOSIS — R06.81 WITNESSED APNEIC SPELLS: ICD-10-CM

## 2022-08-25 DIAGNOSIS — G47.00 INSOMNIA, UNSPECIFIED TYPE: ICD-10-CM

## 2022-08-25 PROCEDURE — 95810 POLYSOM 6/> YRS 4/> PARAM: CPT | Performed by: INTERNAL MEDICINE

## 2022-08-26 NOTE — PATIENT INSTRUCTIONS
[facility]    1. Your sleep study will be reviewed by a sleep physician within the next few days.     2. Please follow up in the sleep clinic as scheduled, or, make an appointment with your sleep provider to be seen within two weeks to discuss the results of the sleep study.    3. If you have any questions or problems with your treatment plan, please contact your sleep clinic provider at [phone] to further manage your condition.    4. Please review your attached medication list, and, at your follow-up appointment advise your sleep clinic provider about any changes.    5. Go to [email or web address] for more information about your sleep problems.    Virgil Hidalgo CNA, RPSGT  August 26, 2022

## 2022-08-26 NOTE — PROGRESS NOTES
Diagnostic PSG completed per provider order.  Patient did not meet criteria for PAP therapy.      Virgil Hidalgo  Polysomngraphy [facility]

## 2022-09-08 DIAGNOSIS — F41.1 GAD (GENERALIZED ANXIETY DISORDER): ICD-10-CM

## 2022-09-08 NOTE — LETTER
September 26, 2022      Nora Og  [address] [place] [address]              Dear Nora,      We recently received a call from your pharmacy requesting a refill of your medication Buspirone.    We are contacting you today to notify you that you are due for a in clinic office visit for further refills.    We have authorized one refill of your medication to allow time for you to schedule your appointment.    Please call [phone] schedule an appointment or if you have MyChart you can schedule with your provider as well.    Taking care of your health is important to us, an ongoing visits with your provider are vital to your care. We look forward to seeing you in the near future.    Thank you for using [facility] for your Medical Needs.        Sincerely,      Hannah Espinoza CNP

## 2022-09-09 ASSESSMENT — SLEEP AND FATIGUE QUESTIONNAIRES
HOW LIKELY ARE YOU TO NOD OFF OR FALL ASLEEP IN A CAR, WHILE STOPPED FOR A FEW MINUTES IN TRAFFIC: WOULD NEVER DOZE
HOW LIKELY ARE YOU TO NOD OFF OR FALL ASLEEP WHILE SITTING INACTIVE IN A PUBLIC PLACE: WOULD NEVER DOZE
HOW LIKELY ARE YOU TO NOD OFF OR FALL ASLEEP WHILE LYING DOWN TO REST IN THE AFTERNOON WHEN CIRCUMSTANCES PERMIT: SLIGHT CHANCE OF DOZING
HOW LIKELY ARE YOU TO NOD OFF OR FALL ASLEEP WHILE SITTING AND TALKING TO SOMEONE: WOULD NEVER DOZE
HOW LIKELY ARE YOU TO NOD OFF OR FALL ASLEEP WHEN YOU ARE A PASSENGER IN A CAR FOR AN HOUR WITHOUT A BREAK: SLIGHT CHANCE OF DOZING
HOW LIKELY ARE YOU TO NOD OFF OR FALL ASLEEP WHILE SITTING QUIETLY AFTER LUNCH WITHOUT ALCOHOL: SLIGHT CHANCE OF DOZING
HOW LIKELY ARE YOU TO NOD OFF OR FALL ASLEEP WHILE WATCHING TV: SLIGHT CHANCE OF DOZING
HOW LIKELY ARE YOU TO NOD OFF OR FALL ASLEEP WHILE SITTING AND READING: SLIGHT CHANCE OF DOZING

## 2022-09-12 RX ORDER — BUSPIRONE HYDROCHLORIDE 10 MG/1
TABLET ORAL
Qty: 180 TABLET | Refills: 0 | Status: SHIPPED | OUTPATIENT
Start: 2022-09-12 | End: 2023-03-24

## 2022-09-14 ENCOUNTER — VIRTUAL VISIT (OUTPATIENT)
Dept: SLEEP MEDICINE | Facility: CLINIC | Age: 57
End: 2022-09-14
Payer: COMMERCIAL

## 2022-09-14 VITALS — HEIGHT: 67 IN | WEIGHT: 265 LBS | BODY MASS INDEX: 41.59 KG/M2

## 2022-09-14 DIAGNOSIS — G47.00 INSOMNIA, UNSPECIFIED TYPE: Primary | ICD-10-CM

## 2022-09-14 DIAGNOSIS — G25.81 RESTLESS LEGS SYNDROME (RLS): ICD-10-CM

## 2022-09-14 DIAGNOSIS — G47.33 OSA (OBSTRUCTIVE SLEEP APNEA): ICD-10-CM

## 2022-09-14 PROCEDURE — 99214 OFFICE O/P EST MOD 30 MIN: CPT | Mod: 95 | Performed by: INTERNAL MEDICINE

## 2022-09-14 RX ORDER — GABAPENTIN 300 MG/1
CAPSULE ORAL
Qty: 60 CAPSULE | Refills: 1 | Status: SHIPPED | OUTPATIENT
Start: 2022-09-14 | End: 2023-10-26

## 2022-09-14 ASSESSMENT — PAIN SCALES - GENERAL: PAINLEVEL: NO PAIN (0)

## 2022-09-14 NOTE — PATIENT INSTRUCTIONS
Insomnia - Restless Leg Syndrome (RLS)  Based on the information that you provided today you are likely to have restless leg syndrome that may be interfering with your sleep.  Treatment of restless leg syndrome usually depends on how much it is bothering you.  If it is a major problem then you might want to do something about it.  Here are some treatment options that you might want to consider:       Behavior Changes:     - Reduce or eliminate caffeine and alcohol products     - Increase the amount of stretching that you do, particularly before bedtime     - Sometimes a leg message can be helpful     - Some people find that a warm bath or shower in the evening is helpful       Medications:     - Gabapentin

## 2022-09-14 NOTE — PROGRESS NOTES
Nora is a 57 year old who is being evaluated via a billable video visit.      How would you like to obtain your AVS? MyChart  If the video visit is dropped, the invitation should be resent by: Text to cell phone: [phone]  Will anyone else be joining your video visit? Kayce    Yo Carrillo      Video-Visit Details    Video Start Time: 10:54 AM    Type of service:  Video Visit    Video End Time:11:16 AM    Originating Location (pt. Location): Other Work    Distant Location (provider location):  [facility] SLEEP [facility]     Platform used for Video Visit: [employer]     Chief complaint: Follow-up sleep testing     LOV Carlos Zaldivar 6/28/2022    History of Present Illness: 57-year-old female who was seen for sleep consultation for snoring and possible sleep apnea by Carlos Zaldivar.  She is also had issues with insomnia and daytime sleepiness.  She has been told about snoring and observed apnea.  She has had a long history of leg twitching and some mild discomfort for years.  She is to have to rub her legs together all the time as a child.  Her leg symptoms have progressed since menopause.  She has been told that she moves her legs around a lot sometimes her arms.  Also, she wakes herself up at night with these movements and cannot fall asleep.  She has difficulty both initiating and maintaining sleep.  She is tried hydroxyzine and trazodone with limited benefit at maintaining sleep.  She also has an oral appliance for bruxism.  She finds that she spits this out unknowingly at times.  Typical bedtimes around 9 PM needs to be at work by 730.  She usually has a glass of wine making dinner.  She usually has 1 to 2 cups of coffee in the morning nothing after 2 PM.    Results of the in lab sleep study were reviewed with her in detail.  There were very frequent limb movements throughout the night causing disruption in sleep with frequent arousals.  There was also overall mild sleep apnea.  No REM sleep was  achieved.  Its possible that severity of sleep apnea was underestimated by lack of REM sleep.    Roe Sleepiness Scale  Total score - Roe: 5 (2022  1:14 PM)   (Less than 10 normal)    Insomnia Severity Scale  NELY Total Score: 24  (normal 0-7, mild 8-14, moderate 15-21, severe 22-28)    Past Medical History:   Diagnosis Date     Fracture of right shoulder 2018     Fracture of right wrist      HAYES (generalized anxiety disorder)      Moderate episode of recurrent major depressive disorder (H)        Allergies   Allergen Reactions     Gluten Meal      intolerance     Latex      Blistering     Mold Cough and Other (See Comments)     Monosodium Glutamate GI Disturbance, Headache and Nausea and Vomiting     Pollen Extract Cough and Other (See Comments)     Seasonal allergies     Bupropion Anxiety       Current Outpatient Medications   Medication     busPIRone (BUSPAR) 10 MG tablet     hydrOXYzine (ATARAX) 25 MG tablet     PARoxetine (PAXIL) 30 MG tablet     vitamin D3 (CHOLECALCIFEROL) 125 MCG (5000 UT) tablet     zolpidem (AMBIEN) 5 MG tablet     No current facility-administered medications for this visit.       Social History     Socioeconomic History     Marital status:      Spouse name: Not on file     Number of children: Not on file     Years of education: Not on file     Highest education level: Not on file   Occupational History     Not on file   Tobacco Use     Smoking status: Former Smoker     Types: Cigarettes     Quit date:      Years since quittin.7     Smokeless tobacco: Never Used     Tobacco comment: Social smoker only   Substance and Sexual Activity     Alcohol use: Yes     Alcohol/week: 7.0 standard drinks     Types: 7 Glasses of wine per week     Drug use: No     Sexual activity: Yes     Partners: Male   Other Topics Concern     Not on file   Social History Narrative    Moved to [place] 3 years ago, had been living in [place] prior, is from [place].     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   "Hannah Espinoza, MENG, APRN, CNP    08/26/21     Social Determinants of Health     Financial Resource Strain: Low Risk      Difficulty of Paying Living Expenses: Not hard at all   Food Insecurity: No Food Insecurity     Worried About Running Out of Food in the Last Year: Never true     Ran Out of Food in the Last Year: Never true   Transportation Needs: No Transportation Needs     Lack of Transportation (Medical): No     Lack of Transportation (Non-Medical): No   Physical Activity: Insufficiently Active     Days of Exercise per Week: 3 days     Minutes of Exercise per Session: 30 min   Stress: Stress Concern Present     Feeling of Stress : Rather much   Social Connections: Socially Integrated     Frequency of Communication with Friends and Family: More than three times a week     Frequency of Social Gatherings with Friends and Family: Once a week     Attends Sikhism Services: 1 to 4 times per year     Active Member of Clubs or Organizations: Yes     Attends Club or Organization Meetings: Not on file     Marital Status:    Intimate Partner Violence: Not on file   Housing Stability: Low Risk      Unable to Pay for Housing in the Last Year: No     Number of Places Lived in the Last Year: 1     Unstable Housing in the Last Year: No       Family History   Problem Relation Age of Onset     Heart Disease Mother      Other - See Comments Mother         Lumpectomy     Heart Disease Father      Heart Surgery Father         Triple     No Known Problems Sister      No Known Problems Sister      No Known Problems Sister      No Known Problems Sister            EXAM:  Ht 1.702 m (5' 7\")   Wt 120.2 kg (265 lb)   BMI 41.50 kg/m    GENERAL: Alert and no distress  EYES: Eyes grossly normal to inspection.  No discharge or erythema, or obvious scleral/conjunctival abnormalities.  RESP: No audible wheeze, cough, or visible cyanosis.  No visible retractions or increased work of breathing.    SKIN: Visible skin clear. No " significant rash, abnormal pigmentation or lesions.  NEURO: Cranial nerves grossly intact.  Mentation and speech appropriate for age.  PSYCH: Mentation appears normal, affect normal, judgement and insight intact, normal speech and appearance well-groomed.       PSG 8/25/2022  Weight 265 lbs BMI 41.6  No REM sleep achieved  AHI 13, RDI 17, Lowest O2 SAT 91%  Periodic limb movement index 150/h, PLM arousal index 19.7/h      ASSESSMENT:  57-year-old female difficulty initiating maintaining sleep.  This may be related to some mild underlying circadian rhythm delay as well as likely frequent periodic limb movement activity motor restlessness.  Overall sleep apnea mild but could be more significant when she experiences REM sleep.    PLAN:  Recommended treatment of mild sleep apnea.  Discussed options of CPAP and oral appliances.  Patient does not think she would be able to do an oral appliance as she is currently spitting out her current splint.  She is interested in trying CPAP.  I did  her that there could be a significant delay in getting a CPAP machine.  Also discussed treatment options for her motor restlessness and periodic limb movement activity.  This appears to be perhaps the predominant issue given her description of her symptoms.  Patient is agreeable to a trial of gabapentin.  We will start with 300 mg an hour or so before bed.  She was counseled about potential side effects of medication and to avoid alcohol.  After a week or 2 at 300 can go to 600.  She should contact us via [employer] if she needs a further dose adjustment.   Follow-up in 2 months with her primary sleep provider.    32 minutes spent on the date of the encounter doing chart review, history and exam, documentation and further activities per the note    Ashleigh De Leon M.D.  Pulmonary/Critical Care/Sleep Medicine    [facility]   Floor 1, [address]   [address] [place] [address]   Appointments: [phone]    The above note was dictated using voice recognition software and may include typographical errors. Please contact the author for any clarifications.

## 2022-09-18 ENCOUNTER — HEALTH MAINTENANCE LETTER (OUTPATIENT)
Age: 57
End: 2022-09-18

## 2022-09-30 PROCEDURE — 90471 IMMUNIZATION ADMIN: CPT | Performed by: FAMILY MEDICINE

## 2022-09-30 PROCEDURE — 90674 CCIIV4 VAC NO PRSV 0.5 ML IM: CPT | Performed by: FAMILY MEDICINE

## 2022-10-11 ENCOUNTER — ANCILLARY PROCEDURE (OUTPATIENT)
Dept: MAMMOGRAPHY | Facility: CLINIC | Age: 57
End: 2022-10-11
Attending: NURSE PRACTITIONER
Payer: COMMERCIAL

## 2022-10-11 DIAGNOSIS — Z12.31 VISIT FOR SCREENING MAMMOGRAM: ICD-10-CM

## 2022-10-11 PROCEDURE — 77067 SCR MAMMO BI INCL CAD: CPT | Mod: TC | Performed by: RADIOLOGY

## 2022-10-16 LAB — SLPCOMP: NORMAL

## 2022-10-17 ENCOUNTER — DOCUMENTATION ONLY (OUTPATIENT)
Dept: SLEEP MEDICINE | Facility: CLINIC | Age: 57
End: 2022-10-17
Payer: COMMERCIAL

## 2022-10-17 DIAGNOSIS — G47.00 INSOMNIA, UNSPECIFIED: ICD-10-CM

## 2022-10-17 DIAGNOSIS — G47.33 OBSTRUCTIVE SLEEP APNEA (ADULT) (PEDIATRIC): Primary | ICD-10-CM

## 2022-10-17 NOTE — PROGRESS NOTES
Patient was offered choice of vendor and chose [facility].  Patient Nora Og was set up at [facility] on October 17, 2022. Patient received a Resmed Airsense 11 Pressures were set at 5-15 cm H2O.   Patient s ramp is 5 cm H2O for Auto and FLEX/EPR is EPR, 2.  Patient received a Resmed Mask name: [id number]  Nasal mask size Standard, heated tubing and heated humidifier.  Patient does not need to meet compliance. Patient has a follow up on TBD with Dr. De Leon.    Nieves Byrnes

## 2022-10-20 ENCOUNTER — DOCUMENTATION ONLY (OUTPATIENT)
Dept: SLEEP MEDICINE | Facility: CLINIC | Age: 57
End: 2022-10-20
Payer: COMMERCIAL

## 2022-10-20 NOTE — PROGRESS NOTES
3 day Sleep therapy management telephone visit    Diagnostic AHI: 13.0  PSG    Confirmed with patient at time of call- N/A Patient is still interested in STM service       Message left for patient to return call    Order settings:  CPAP MIN CPAP MAX   5 cm H2O 15 cm H2O       Device settings:  CPAP MIN CPAP MAX EPR RESMED SOFT RESPONSE SETTING   5 cm  H20 15 cm  H20 2 OFF       Compliance 0 %    Assessment: No usage but has account in [facility]       Action plan: Patient to have 14 day STM visit. Patient has a follow up visit scheduled:   no and not required by insurance    Replacement device: No  STM ordered by provider: Yes     Total time spent on accessing and  interpreting remote patient PAP therapy data  10 minutes    Total time spent counseling, coaching  and reviewing PAP therapy data with patient  1 minutes    [id number] no

## 2022-11-01 ENCOUNTER — DOCUMENTATION ONLY (OUTPATIENT)
Dept: SLEEP MEDICINE | Facility: CLINIC | Age: 57
End: 2022-11-01
Payer: COMMERCIAL

## 2022-11-01 NOTE — PROGRESS NOTES
14  DAY STM VISIT    Diagnostic AHI: 13.0  PSG    Message left for patient to return call     Assessment: Pt not meeting objective benchmarks for compliance      Action plan: waiting for patient to return call.  and pt to have 30 day STM visit.      Device type: Auto-CPAP    PAP settings:  CPAP MIN CPAP MAX 95TH % PRESSURE EPR RESMED SOFT RESPONSE SETTING   5 cm  H20 15 cm  H20 6.4 cm  H20  2 OFF     Mask type:  Nasal Mask    Objective measures: 14 day rolling measures   COMPLIANCE LEAK AHI AVERAGE USE IN MINUTES   7 % 29.6 1.1 106   GOAL >70% GOAL < 24 LPM GOAL <5 GOAL >240          Total time spent on accessing and interpreting remote patient PAP therapy data  10 minutes    Total time spent counseling, coaching  and reviewing PAP therapy data with patient  1 minute    [id number]  [id number]  no (3 day STM)       Addended by: MERCY VIDALES on: 11/26/2019 06:03 PM     Modules accepted: Orders

## 2022-11-17 ENCOUNTER — DOCUMENTATION ONLY (OUTPATIENT)
Dept: SLEEP MEDICINE | Facility: CLINIC | Age: 57
End: 2022-11-17
Payer: COMMERCIAL

## 2022-11-17 NOTE — PROGRESS NOTES
30 DAY STM VISIT    Diagnostic AHI: 13.0  PSG    PAP settings:  CPAP MIN CPAP MAX 95TH % PRESSURE EPR RESMED SOFT RESPONSE SETTING   5.0 cm  H20 15.0 cm  H20 7.3 cm  H20  TWO OFF     Device type: Auto-CPAP  Mask type:  Nasal Mask    Objective measures: 14 day rolling measures:    COMPLIANCE LEAK AHI AVERAGE USE IN MINUTES   7 % 17.25 1.55 30   GOAL >70% GOAL < 24 LPM GOAL <5 GOAL >240        Assessment: Pt not meeting objective benchmarks for compliance  Patient failing following subjective benchmarks: congestion  Subjective measures:   Patient has not used CPAP due to having a cold and feeling congested when she lies down even without CPAP. Patient says she will restart CPAP soon. [facility]   Action plan: pt to have 6 month [facility] visit  Patient has not scheduled a follow up visit.     Total time spent on accessing and interpreting remote patient PAP therapy data  10 minutes    Total time spent counseling, coaching  and reviewing PAP therapy data with patient  3 minutes     [id number] this call  [id number] no  at 3 or 14 day [facility]

## 2022-11-18 ENCOUNTER — DOCUMENTATION ONLY (OUTPATIENT)
Dept: SLEEP MEDICINE | Facility: CLINIC | Age: 57
End: 2022-11-18
Payer: COMMERCIAL

## 2022-11-18 NOTE — PROGRESS NOTES
Patient came to [facility] for mask fitting appointment on November 18, 2022. Patient requested to switch masks because oral breathing. Discussed the following masks: VITERA, F30I, F20, DREAMWEAR Patient selected a Resmed Mask name: AIRFIT F30I Full Face mask size Medium

## 2023-01-07 ENCOUNTER — MYC MEDICAL ADVICE (OUTPATIENT)
Dept: PEDIATRICS | Facility: CLINIC | Age: 58
End: 2023-01-07

## 2023-01-07 DIAGNOSIS — F41.1 GAD (GENERALIZED ANXIETY DISORDER): ICD-10-CM

## 2023-01-09 RX ORDER — PAROXETINE 30 MG/1
TABLET, FILM COATED ORAL
Qty: 90 TABLET | Refills: 0 | Status: SHIPPED | OUTPATIENT
Start: 2023-01-09 | End: 2023-03-24

## 2023-01-09 NOTE — TELEPHONE ENCOUNTER
Prescription approved per [facility] Refill Protocol.  Matt JOSEPH RN 1/9/2023 at 7:35 AM

## 2023-03-23 SDOH — ECONOMIC STABILITY: INCOME INSECURITY: IN THE LAST 12 MONTHS, WAS THERE A TIME WHEN YOU WERE NOT ABLE TO PAY THE MORTGAGE OR RENT ON TIME?: NO

## 2023-03-23 SDOH — HEALTH STABILITY: PHYSICAL HEALTH: ON AVERAGE, HOW MANY MINUTES DO YOU ENGAGE IN EXERCISE AT THIS LEVEL?: 40 MIN

## 2023-03-23 SDOH — ECONOMIC STABILITY: FOOD INSECURITY: WITHIN THE PAST 12 MONTHS, THE FOOD YOU BOUGHT JUST DIDN'T LAST AND YOU DIDN'T HAVE MONEY TO GET MORE.: NEVER TRUE

## 2023-03-23 SDOH — HEALTH STABILITY: PHYSICAL HEALTH: ON AVERAGE, HOW MANY DAYS PER WEEK DO YOU ENGAGE IN MODERATE TO STRENUOUS EXERCISE (LIKE A BRISK WALK)?: 3 DAYS

## 2023-03-23 SDOH — ECONOMIC STABILITY: INCOME INSECURITY: HOW HARD IS IT FOR YOU TO PAY FOR THE VERY BASICS LIKE FOOD, HOUSING, MEDICAL CARE, AND HEATING?: NOT HARD AT ALL

## 2023-03-23 SDOH — ECONOMIC STABILITY: FOOD INSECURITY: WITHIN THE PAST 12 MONTHS, YOU WORRIED THAT YOUR FOOD WOULD RUN OUT BEFORE YOU GOT MONEY TO BUY MORE.: NEVER TRUE

## 2023-03-23 ASSESSMENT — LIFESTYLE VARIABLES
AUDIT-C TOTAL SCORE: 2
HOW OFTEN DO YOU HAVE A DRINK CONTAINING ALCOHOL: 2-4 TIMES A MONTH
SKIP TO QUESTIONS 9-10: 1
HOW OFTEN DO YOU HAVE SIX OR MORE DRINKS ON ONE OCCASION: NEVER
HOW MANY STANDARD DRINKS CONTAINING ALCOHOL DO YOU HAVE ON A TYPICAL DAY: 1 OR 2

## 2023-03-23 ASSESSMENT — SOCIAL DETERMINANTS OF HEALTH (SDOH)
HOW OFTEN DO YOU ATTEND CHURCH OR RELIGIOUS SERVICES?: 1 TO 4 TIMES PER YEAR
IN A TYPICAL WEEK, HOW MANY TIMES DO YOU TALK ON THE PHONE WITH FAMILY, FRIENDS, OR NEIGHBORS?: MORE THAN THREE TIMES A WEEK
HOW OFTEN DO YOU GET TOGETHER WITH FRIENDS OR RELATIVES?: ONCE A WEEK
DO YOU BELONG TO ANY CLUBS OR ORGANIZATIONS SUCH AS CHURCH GROUPS UNIONS, FRATERNAL OR ATHLETIC GROUPS, OR SCHOOL GROUPS?: NO

## 2023-03-23 ASSESSMENT — ENCOUNTER SYMPTOMS
HEMATOCHEZIA: 0
WEAKNESS: 0
MYALGIAS: 1
JOINT SWELLING: 1
CHILLS: 0
EYE PAIN: 0
FEVER: 0
ABDOMINAL PAIN: 0
NERVOUS/ANXIOUS: 1
HEADACHES: 0
HEMATURIA: 0
HEARTBURN: 0
SORE THROAT: 0
DYSURIA: 0
ARTHRALGIAS: 1
SHORTNESS OF BREATH: 0
DIARRHEA: 0
BREAST MASS: 0
FREQUENCY: 0
PARESTHESIAS: 0
DIZZINESS: 0
NAUSEA: 0
COUGH: 0
PALPITATIONS: 0
CONSTIPATION: 0

## 2023-03-24 ENCOUNTER — OFFICE VISIT (OUTPATIENT)
Dept: PEDIATRICS | Facility: CLINIC | Age: 58
End: 2023-03-24
Payer: COMMERCIAL

## 2023-03-24 VITALS
DIASTOLIC BLOOD PRESSURE: 70 MMHG | OXYGEN SATURATION: 96 % | BODY MASS INDEX: 42.06 KG/M2 | TEMPERATURE: 98.5 F | RESPIRATION RATE: 16 BRPM | HEIGHT: 67 IN | WEIGHT: 268 LBS | SYSTOLIC BLOOD PRESSURE: 130 MMHG | HEART RATE: 88 BPM

## 2023-03-24 DIAGNOSIS — Z12.4 CERVICAL CANCER SCREENING: ICD-10-CM

## 2023-03-24 DIAGNOSIS — F41.1 GAD (GENERALIZED ANXIETY DISORDER): ICD-10-CM

## 2023-03-24 DIAGNOSIS — Z87.891 PERSONAL HISTORY OF TOBACCO USE, PRESENTING HAZARDS TO HEALTH: ICD-10-CM

## 2023-03-24 DIAGNOSIS — E66.01 CLASS 3 SEVERE OBESITY DUE TO EXCESS CALORIES WITHOUT SERIOUS COMORBIDITY WITH BODY MASS INDEX (BMI) OF 40.0 TO 44.9 IN ADULT (H): ICD-10-CM

## 2023-03-24 DIAGNOSIS — Z00.00 ROUTINE GENERAL MEDICAL EXAMINATION AT A HEALTH CARE FACILITY: Primary | ICD-10-CM

## 2023-03-24 DIAGNOSIS — E66.813 CLASS 3 SEVERE OBESITY DUE TO EXCESS CALORIES WITHOUT SERIOUS COMORBIDITY WITH BODY MASS INDEX (BMI) OF 40.0 TO 44.9 IN ADULT (H): ICD-10-CM

## 2023-03-24 LAB
ALBUMIN SERPL BCG-MCNC: 4.3 G/DL (ref 3.5–5.2)
ALP SERPL-CCNC: 76 U/L (ref 35–104)
ALT SERPL W P-5'-P-CCNC: 58 U/L (ref 10–35)
ANION GAP SERPL CALCULATED.3IONS-SCNC: 15 MMOL/L (ref 7–15)
AST SERPL W P-5'-P-CCNC: 57 U/L (ref 10–35)
BILIRUB SERPL-MCNC: 0.3 MG/DL
BUN SERPL-MCNC: 13.1 MG/DL (ref 8–23)
CALCIUM SERPL-MCNC: 9.3 MG/DL (ref 8.6–10)
CHLORIDE SERPL-SCNC: 101 MMOL/L (ref 98–107)
CHOLEST SERPL-MCNC: 261 MG/DL
CREAT SERPL-MCNC: 0.74 MG/DL (ref 0.51–0.95)
DEPRECATED HCO3 PLAS-SCNC: 24 MMOL/L (ref 22–29)
GFR SERPL CREATININE-BSD FRML MDRD: >90 ML/MIN/1.73M2
GLUCOSE SERPL-MCNC: 106 MG/DL (ref 70–99)
HBA1C MFR BLD: 5.7 % (ref 0–5.6)
HDLC SERPL-MCNC: 44 MG/DL
LDLC SERPL CALC-MCNC: 171 MG/DL
NONHDLC SERPL-MCNC: 217 MG/DL
POTASSIUM SERPL-SCNC: 3.7 MMOL/L (ref 3.4–5.3)
PROT SERPL-MCNC: 7.5 G/DL (ref 6.4–8.3)
SODIUM SERPL-SCNC: 140 MMOL/L (ref 136–145)
TRIGL SERPL-MCNC: 230 MG/DL

## 2023-03-24 PROCEDURE — 90677 PCV20 VACCINE IM: CPT | Performed by: NURSE PRACTITIONER

## 2023-03-24 PROCEDURE — 83036 HEMOGLOBIN GLYCOSYLATED A1C: CPT | Performed by: NURSE PRACTITIONER

## 2023-03-24 PROCEDURE — 80061 LIPID PANEL: CPT | Performed by: NURSE PRACTITIONER

## 2023-03-24 PROCEDURE — 90471 IMMUNIZATION ADMIN: CPT | Performed by: NURSE PRACTITIONER

## 2023-03-24 PROCEDURE — 80053 COMPREHEN METABOLIC PANEL: CPT | Performed by: NURSE PRACTITIONER

## 2023-03-24 PROCEDURE — 87624 HPV HI-RISK TYP POOLED RSLT: CPT | Performed by: NURSE PRACTITIONER

## 2023-03-24 PROCEDURE — 99213 OFFICE O/P EST LOW 20 MIN: CPT | Mod: 25 | Performed by: NURSE PRACTITIONER

## 2023-03-24 PROCEDURE — 36415 COLL VENOUS BLD VENIPUNCTURE: CPT | Performed by: NURSE PRACTITIONER

## 2023-03-24 PROCEDURE — G0145 SCR C/V CYTO,THINLAYER,RESCR: HCPCS | Performed by: NURSE PRACTITIONER

## 2023-03-24 PROCEDURE — 99396 PREV VISIT EST AGE 40-64: CPT | Mod: 25 | Performed by: NURSE PRACTITIONER

## 2023-03-24 RX ORDER — BUSPIRONE HYDROCHLORIDE 10 MG/1
10 TABLET ORAL 2 TIMES DAILY
Qty: 180 TABLET | Refills: 3 | Status: SHIPPED | OUTPATIENT
Start: 2023-03-24 | End: 2024-04-08

## 2023-03-24 RX ORDER — PAROXETINE 30 MG/1
30 TABLET, FILM COATED ORAL EVERY MORNING
Qty: 90 TABLET | Refills: 3 | Status: SHIPPED | OUTPATIENT
Start: 2023-03-24 | End: 2024-04-08

## 2023-03-24 ASSESSMENT — ENCOUNTER SYMPTOMS
NERVOUS/ANXIOUS: 1
ABDOMINAL PAIN: 0
DYSURIA: 0
HEARTBURN: 0
COUGH: 0
WEAKNESS: 0
JOINT SWELLING: 1
HEMATURIA: 0
MYALGIAS: 1
PALPITATIONS: 0
EYE PAIN: 0
SORE THROAT: 0
FEVER: 0
CONSTIPATION: 0
ARTHRALGIAS: 1
CHILLS: 0
HEADACHES: 0
PARESTHESIAS: 0
DIZZINESS: 0
FREQUENCY: 0
DIARRHEA: 0
BREAST MASS: 0
HEMATOCHEZIA: 0
SHORTNESS OF BREATH: 0
NAUSEA: 0

## 2023-03-24 ASSESSMENT — PAIN SCALES - GENERAL: PAINLEVEL: MODERATE PAIN (5)

## 2023-03-24 NOTE — PROGRESS NOTES
SUBJECTIVE:   CC: Nora is an 57 year old who presents for preventive health visit.   Patient has been advised of split billing requirements and indicates understanding: Yes  Healthy Habits:     Getting at least 3 servings of Calcium per day:  Yes    Bi-annual eye exam:  Yes    Dental care twice a year:  Yes    Sleep apnea or symptoms of sleep apnea:  Excessive snoring and Sleep apnea    Diet:  Gluten-free/reduced    Frequency of exercise:  2-3 days/week    Duration of exercise:  30-45 minutes    Taking medications regularly:  Yes    Medication side effects:  Not applicable    PHQ-2 Total Score: 1    Additional concerns today:  Yes      Colonoscopy in  in [place], results reportedly normal, repeat in .   Mammo - utd  Pap - due    Today's PHQ-2 Score:   PHQ-2 (  [employer]) 3/23/2023   Q1: Little interest or pleasure in doing things 1   Q2: Feeling down, depressed or hopeless 0   PHQ-2 Score 1   PHQ-2 Total Score (12-17 Years)- Positive if 3 or more points; Administer PHQ-A if positive -   Q1: Little interest or pleasure in doing things Several days   Q2: Feeling down, depressed or hopeless Not at all   PHQ-2 Score 1       Social History     Tobacco Use     Smoking status: Former     Packs/day: 0.50     Years: 30.00     Pack years: 15.00     Types: Cigarettes     Start date: 1980     Quit date: 2010     Years since quittin.2     Smokeless tobacco: Never     Tobacco comments:     Social smoker only   Substance Use Topics     Alcohol use: Yes     Alcohol/week: 7.0 standard drinks     Types: 7 Glasses of wine per week     Comment: Had increased over the holidays but has now returned to norm     Alcohol Use 3/23/2023   Prescreen: >3 drinks/day or >7 drinks/week? No   AUDIT SCORE  -     Reviewed orders with patient.  Reviewed health maintenance and updated orders accordingly - Yes  BP Readings from Last 3 Encounters:   23 130/70   22 138/86   22 126/80    Wt Readings from  Last 3 Encounters:   03/24/23 121.6 kg (268 lb)   09/14/22 120.2 kg (265 lb)   06/28/22 120.2 kg (265 lb)         Breast Cancer Screening:    FHS-7:   Breast CA Risk Assessment (FHS-7) 1/13/2022 1/14/2022 10/11/2022   Did any of your first-degree relatives have breast or ovarian cancer? Yes Yes No   Did any of your relatives have bilateral breast cancer? No No No   Did any man in your family have breast cancer? No No No   Did any woman in your family have breast and ovarian cancer? Yes Yes No   Did any woman in your family have breast cancer before age 50 y? Yes Yes No   Do you have 2 or more relatives with breast and/or ovarian cancer? Yes Yes No   Do you have 2 or more relatives with breast and/or bowel cancer? Yes Yes No       Mammogram Screening: Recommended mammography every 1-2 years with patient discussion and risk factor consideration  Pertinent mammograms are reviewed under the imaging tab.    History of abnormal Pap smear: NO - age 30-65 PAP every 5 years with negative HPV co-testing recommended     Reviewed and updated as needed this visit by clinical staff   Tobacco  Allergies               Reviewed and updated as needed this visit by Provider                 Patient Active Problem List   Diagnosis     Anxiety     Morbid obesity (H)     Prediabetes     H/O partial thyroidectomy     EDIS (obstructive sleep apnea)     Restless legs syndrome (RLS)     Past Medical History:   Diagnosis Date     Depressive disorder 1/1/2000     Fracture of right shoulder 2018     Fracture of right wrist      HAYES (generalized anxiety disorder)      Moderate episode of recurrent major depressive disorder (H)      Thyroid disease 2016    Partial thyroidectomy due to cyst     Uncomplicated asthma 2000    Allergy induced     Past Surgical History:   Procedure Laterality Date     COLONOSCOPY  11/5/2015     DILATION AND CURETTAGE  2016     ENT SURGERY  6/1/2016    Partial Thyroidectomy     THYROIDECTOMY   2016    Partial, doesn't  require meds     Family History   Problem Relation Age of Onset     Heart Disease Mother      Other - See Comments Mother         Lumpectomy     Hypertension Mother      Hyperlipidemia Mother      Breast Cancer Mother      Obesity Mother      Heart Disease Father      Heart Surgery Father         Triple     Hypertension Father      Hyperlipidemia Father      No Known Problems Sister      No Known Problems Sister      No Known Problems Sister      No Known Problems Sister      Breast Cancer Other         Aunt on mothers side     Depression Sister      Anxiety Disorder Sister      Depression Sister      Anxiety Disorder Sister      Obesity Sister      Social History     Socioeconomic History     Marital status:      Spouse name: Not on file     Number of children: Not on file     Years of education: Not on file     Highest education level: Not on file   Occupational History     Not on file   Tobacco Use     Smoking status: Former     Packs/day: 0.50     Years: 30.00     Pack years: 15.00     Types: Cigarettes     Start date: 1980     Quit date: 2010     Years since quittin.2     Smokeless tobacco: Never     Tobacco comments:     Social smoker only   Vaping Use     Vaping Use: Never used   Substance and Sexual Activity     Alcohol use: Yes     Alcohol/week: 7.0 standard drinks     Types: 7 Glasses of wine per week     Comment: Had increased over the holidays but has now returned to norm     Drug use: No     Sexual activity: Yes     Partners: Male     Birth control/protection: Post-menopausal   Other Topics Concern     Parent/sibling w/ CABG, MI or angioplasty before 65F 55M? No   Social History Narrative    Moved to [place] 3 years ago, had been living in [facility] prior, is from [place].     Works as RMA in [email or web address]         Hannah Espinoza, DNP, APRN, CNP    19     Social Determinants of Health     Financial Resource Strain: Low Risk      Difficulty of Paying Living Expenses: Not hard at all    Food Insecurity: No Food Insecurity     Worried About Running Out of Food in the Last Year: Never true     Ran Out of Food in the Last Year: Never true   Transportation Needs: No Transportation Needs     Lack of Transportation (Medical): No     Lack of Transportation (Non-Medical): No   Physical Activity: Insufficiently Active     Days of Exercise per Week: 3 days     Minutes of Exercise per Session: 40 min   Stress: Stress Concern Present     Feeling of Stress : To some extent   Social Connections: Moderately Integrated     Frequency of Communication with Friends and Family: More than three times a week     Frequency of Social Gatherings with Friends and Family: Once a week     Attends Mu-ism Services: 1 to 4 times per year     Active Member of Clubs or Organizations: No     Attends Club or Organization Meetings: Not on file     Marital Status:    Intimate Partner Violence: Not on file   Housing Stability: Low Risk      Unable to Pay for Housing in the Last Year: No     Number of Places Lived in the Last Year: 1     Unstable Housing in the Last Year: No     Current Outpatient Medications   Medication     busPIRone (BUSPAR) 10 MG tablet     gabapentin (NEURONTIN) 300 MG capsule     PARoxetine (PAXIL) 30 MG tablet     vitamin D3 (CHOLECALCIFEROL) 125 MCG (5000 UT) tablet     No current facility-administered medications for this visit.        Allergies   Allergen Reactions     Gluten Meal      intolerance     Latex      Blistering     Mold Cough and Other (See Comments)     Monosodium Glutamate GI Disturbance, Headache and Nausea and Vomiting     Pollen Extract Cough and Other (See Comments)     Seasonal allergies     Bupropion Anxiety         Review of Systems   Constitutional: Negative for chills and fever.   HENT: Positive for congestion. Negative for ear pain, hearing loss and sore throat.    Eyes: Negative for pain and visual disturbance.   Respiratory: Negative for cough and shortness of breath.   "  Cardiovascular: Positive for peripheral edema. Negative for chest pain and palpitations.   Gastrointestinal: Negative for abdominal pain, constipation, diarrhea, heartburn, hematochezia and nausea.   Breasts:  Negative for tenderness, breast mass and discharge.   Genitourinary: Negative for dysuria, frequency, genital sores, hematuria, pelvic pain, urgency, vaginal bleeding and vaginal discharge.   Musculoskeletal: Positive for arthralgias, joint swelling and myalgias.   Skin: Negative for rash.   Neurological: Negative for dizziness, weakness, headaches and paresthesias.   Psychiatric/Behavioral: Negative for mood changes. The patient is nervous/anxious.         OBJECTIVE:   /70   Pulse 88   Temp 98.5  F (36.9  C) (Tympanic)   Resp 16   Ht 1.69 m (5' 6.54\")   Wt 121.6 kg (268 lb)   SpO2 96%   BMI 42.56 kg/m    Physical Exam  Constitutional: appears to be in no acute distress, comfortable, pleasant.   Eyes: anicteric, conjunctiva clear without drainage or erythema. AUSTYN.   Ears, Nose and Throat: tympanic membranes gray with LR,  nose without nasal discharge. OP: no erythema to posterior pharynx, negative post nasal drainage, tonsils +1 no erythema or exudate.  Neck: supple, thyroid palpable,not enlarged, no nodules   Breast: Exam deferred (deferred after discussion of exam options with patient, no symptoms or concerns).   Cardiovascular: regular rate and rhythm, normal S1 and S2, no murmurs, rubs or gallops, peripheral pulses full and symmetric; negative peripheral edema   Respiratory: Air entry throughout. Breathing pattern unlabored without the use of accessory muscles. Clear to auscultation A and P, no wheezes or crackles, normal breath sounds.    Gastrointestinal: rounded, soft. Positive bowel sounds x4, nontender, no masses.   Genitourinary: external genitalia is without lesions. Introitus is normal, vaginal walls pink and moist without lesions or evidence of trauma. There is no abnormal " discharge from the cervix. Cervix is pink without polyps or lesions.  Musculoskeletal: full range of motion, no edema.   Skin: pink, turgor smooth and elastic. Negative for lesions or dryness.  Neurological: normal gait, no tremor.   Psychological: appropriate mood and affect.   Lymphatic: no cervical, axillary, supraclavicular, or infraclavicular lymphadenopathy.    Diagnostic Test Results:  Labs reviewed in Epic    ASSESSMENT/PLAN:   (Z00.00) Routine general medical examination at a health care facility  (primary encounter diagnosis)  Age appropriate screening and preventative care have been addressed today. Vaccinations have been updated. Patient has been advised to follow a balanced diet with adequate calcium and vitamin D. Colonoscopy has been reviewed and is up to date at this time. Recommend annual vision exams as well as biannual dental exams. They will follow up for annual physical again in one year.   - Colonoscopy in 2015 in [place], results reportedly normal, repeat in 2025.   - Mammo - utd  - Pap - due  - Hot flashes have improved since last year    (Z12.4) Cervical cancer screening  - Pap Screen with HPV - recommended age 30 - 65 years          (E66.01,  Z68.41) Class 3 severe obesity due to excess calories without serious comorbidity with body mass index (BMI) of 40.0 to 44.9 in adult (H)  Body mass index is 42.56 kg/m .  - Comprehensive metabolic panel (BMP + Alb, Alk Phos, ALT, AST, Total. Bili, TP)  - Lipid panel reflex to direct LDL Fasting, Hemoglobin A1c          (F41.1) HAYES (generalized anxiety disorder)  Chronic, stable. Continue paxil and buspirone without changes. Has tried to wean off paxil in the past and it didn't go well.   - busPIRone (BUSPAR) 10 MG tablet  - PARoxetine (PAXIL) 30 MG tablet  - OFFICE/OUTPT VISIT,ESTLEVMARYSE III          (Z87.891) Personal history of tobacco use, presenting hazards to health  - PNEUMOCOCCAL 20 VALENT CONJUGATE (PREVNAR 20)           Follow-up: Lab  "results pending, will follow-up as indicated after reviewing results.       COUNSELING:  Reviewed preventive health counseling, as reflected in patient instructions  Special attention given to:        Healthy diet/nutrition       Immunizations    Vaccinated for: Pneumococcal         Osteoporosis prevention/bone health       Colorectal Cancer Screening       (Leonie)menopause management       One time pneumovax for smokers      BMI:   Estimated body mass index is 42.56 kg/m  as calculated from the following:    Height as of this encounter: 1.69 m (5' 6.54\").    Weight as of this encounter: 121.6 kg (268 lb).   Weight management plan: Discussed healthy diet and exercise guidelines      She reports that she quit smoking about 13 years ago. Her smoking use included cigarettes. She started smoking about 43 years ago. She has a 15.00 pack-year smoking history. She has never used smokeless tobacco.             ABHINAV Magallanes [facility] [place]  "

## 2023-03-29 LAB
BKR LAB AP GYN ADEQUACY: NORMAL
BKR LAB AP GYN INTERPRETATION: NORMAL
BKR LAB AP HPV REFLEX: NORMAL
BKR LAB AP PREVIOUS ABNORMAL: NORMAL
PATH REPORT.COMMENTS IMP SPEC: NORMAL
PATH REPORT.COMMENTS IMP SPEC: NORMAL
PATH REPORT.RELEVANT HX SPEC: NORMAL

## 2023-03-31 LAB
HUMAN PAPILLOMA VIRUS 16 DNA: NEGATIVE
HUMAN PAPILLOMA VIRUS 18 DNA: NEGATIVE
HUMAN PAPILLOMA VIRUS FINAL DIAGNOSIS: NORMAL
HUMAN PAPILLOMA VIRUS OTHER HR: NEGATIVE

## 2023-05-26 ENCOUNTER — MYC MEDICAL ADVICE (OUTPATIENT)
Dept: PEDIATRICS | Facility: CLINIC | Age: 58
End: 2023-05-26
Payer: COMMERCIAL

## 2023-05-26 NOTE — TELEPHONE ENCOUNTER
Please see mary my chart message. Any other suggestions or do you want an E-visit for this?    Please advise.    SHASTA Escobar on 5/26/2023 at 3:23 PM

## 2023-08-07 ENCOUNTER — TRANSFERRED RECORDS (OUTPATIENT)
Dept: HEALTH INFORMATION MANAGEMENT | Facility: CLINIC | Age: 58
End: 2023-08-07
Payer: COMMERCIAL

## 2023-08-24 ENCOUNTER — TRANSFERRED RECORDS (OUTPATIENT)
Dept: HEALTH INFORMATION MANAGEMENT | Facility: CLINIC | Age: 58
End: 2023-08-24
Payer: COMMERCIAL

## 2023-10-17 ENCOUNTER — TRANSFERRED RECORDS (OUTPATIENT)
Dept: HEALTH INFORMATION MANAGEMENT | Facility: CLINIC | Age: 58
End: 2023-10-17
Payer: COMMERCIAL

## 2023-10-17 ENCOUNTER — TELEPHONE (OUTPATIENT)
Dept: PEDIATRICS | Facility: CLINIC | Age: 58
End: 2023-10-17
Payer: COMMERCIAL

## 2023-10-17 NOTE — TELEPHONE ENCOUNTER
Pt calling to schedule pre-op before 10/30. Scheduled pt to be seen.    Rayray Warren RN on 10/17/2023 at 3:40 PM

## 2023-10-26 ENCOUNTER — OFFICE VISIT (OUTPATIENT)
Dept: PEDIATRICS | Facility: CLINIC | Age: 58
End: 2023-10-26
Payer: COMMERCIAL

## 2023-10-26 VITALS
SYSTOLIC BLOOD PRESSURE: 134 MMHG | HEIGHT: 66 IN | WEIGHT: 261.4 LBS | BODY MASS INDEX: 42.01 KG/M2 | RESPIRATION RATE: 20 BRPM | DIASTOLIC BLOOD PRESSURE: 88 MMHG | TEMPERATURE: 98.4 F | HEART RATE: 86 BPM | OXYGEN SATURATION: 97 %

## 2023-10-26 DIAGNOSIS — Z23 HIGH PRIORITY FOR 2019-NCOV VACCINE: ICD-10-CM

## 2023-10-26 DIAGNOSIS — R73.03 PREDIABETES: ICD-10-CM

## 2023-10-26 DIAGNOSIS — Z12.31 ENCOUNTER FOR SCREENING MAMMOGRAM FOR BREAST CANCER: ICD-10-CM

## 2023-10-26 DIAGNOSIS — G47.33 OSA (OBSTRUCTIVE SLEEP APNEA): ICD-10-CM

## 2023-10-26 DIAGNOSIS — Z01.818 PREOP GENERAL PHYSICAL EXAM: Primary | ICD-10-CM

## 2023-10-26 DIAGNOSIS — M92.62 HAGLUND'S DEFORMITY, LEFT: ICD-10-CM

## 2023-10-26 DIAGNOSIS — E89.0 H/O PARTIAL THYROIDECTOMY: ICD-10-CM

## 2023-10-26 DIAGNOSIS — E66.01 MORBID OBESITY (H): ICD-10-CM

## 2023-10-26 PROCEDURE — 91320 SARSCV2 VAC 30MCG TRS-SUC IM: CPT | Performed by: NURSE PRACTITIONER

## 2023-10-26 PROCEDURE — 90480 ADMN SARSCOV2 VAC 1/ONLY CMP: CPT | Performed by: NURSE PRACTITIONER

## 2023-10-26 PROCEDURE — 99214 OFFICE O/P EST MOD 30 MIN: CPT | Mod: 25 | Performed by: NURSE PRACTITIONER

## 2023-10-26 RX ORDER — SEMAGLUTIDE 0.5 MG/.5ML
0.5 INJECTION, SOLUTION SUBCUTANEOUS WEEKLY
COMMUNITY

## 2023-10-26 ASSESSMENT — PAIN SCALES - GENERAL: PAINLEVEL: NO PAIN (0)

## 2023-10-26 NOTE — PROGRESS NOTES
[facility] [address]  [address]  [place] [address]  Phone: [phone]  Fax: [phone]  Primary Provider: Hannah Espinoza  Pre-op Performing Provider: KIMBERLEY PIÑA    PREOPERATIVE EVALUATION:  Today's date: 10/26/2023    Nora is a 58 year old female who presents for a preoperative evaluation.      10/26/2023     2:54 PM   Additional Questions   Roomed by Loren Dow   Accompanied by N/A         10/26/2023     2:54 PM   Patient Reported Additional Medications   Patient reports taking the following new medications Yes Wegovy       Surgical Information:  Surgery/Procedure:  [place] deformity left Ankle  Surgery Location: [facility]  Surgeon: Damari Pérez  Surgery Date: 10/30/23  Time of Surgery: 9:15 AM  Where patient plans to recover: At home with family  Fax number for surgical facility: [phone]    Assessment & Plan     The proposed surgical procedure is considered INTERMEDIATE risk.    Preop general physical exam  Mary's deformity, left  Reason for procedure.    EDIS (obstructive sleep apnea)  Has CPAP, compliance variable with this.    Prediabetes  Morbid obesity (H)  Noted as risk, working on weight loss with GLP-1. Hold 1 week before surgery.    H/O partial thyroidectomy  Last TSH stable.    High priority for 2019-nCoV vaccine  Ok to vaccinate with upcoming surgery.    Encounter for screening mammogram for breast cancer  - *MA Screening Digital Bilateral; Future      Risks and Recommendations:  The patient has the following additional risks and recommendations for perioperative complications:   - Morbid obesity (BMI >40)    Antiplatelet or Anticoagulation Medication Instructions:   - Patient is on no antiplatelet or anticoagulation medications.    Additional Medication Instructions:  Patient is to take all scheduled medications on the day of surgery EXCEPT for modifications listed below:   - GLP-1 Injectable (exenitide, liraglutide, semaglutide, dulaglutide,  etc.): HOLD 7 days before surgery    - SSRIs, SNRIs, TCAs, Antipsychotics: Continue without modification.     RECOMMENDATION:  APPROVAL GIVEN to proceed with proposed procedure, without further diagnostic evaluation.    Subjective     HPI related to upcoming procedure: hx of left foot/ankle pain and kirti's deformity        10/26/2023     2:44 PM   Preop Questions   1. Have you ever had a heart attack or stroke? No   2. Have you ever had surgery on your heart or blood vessels, such as a stent placement, a coronary artery bypass, or surgery on an artery in your head, neck, heart, or legs? No   3. Do you have chest pain with activity? No   4. Do you have a history of  heart failure? No   5. Do you currently have a cold, bronchitis or symptoms of other infection? No   6. Do you have a cough, shortness of breath, or wheezing? No   7. Do you or anyone in your family have previous history of blood clots? No   8. Do you or does anyone in your family have a serious bleeding problem such as prolonged bleeding following surgeries or cuts? No   9. Have you ever had problems with anemia or been told to take iron pills? No   10. Have you had any abnormal blood loss such as black, tarry or bloody stools, or abnormal vaginal bleeding? No   11. Have you ever had a blood transfusion? No   12. Are you willing to have a blood transfusion if it is medically needed before, during, or after your surgery? Yes   13. Have you or any of your relatives ever had problems with anesthesia? No   14. Do you have sleep apnea, excessive snoring or daytime drowsiness? YES - EDIS, has CPAP.   14a. Do you have a CPAP machine? Yes   15. Do you have any artifical heart valves or other implanted medical devices like a pacemaker, defibrillator, or continuous glucose monitor? No   16. Do you have artificial joints? No   17. Are you allergic to latex? YES: rash/blistering   18. Is there any chance that you may be pregnant? No       Health Care  Directive:  Patient does not have a Health Care Directive or Living Will:    Preoperative Review of :   reviewed - no record of controlled substances prescribed.        Review of Systems  Constitutional, neuro, ENT, endocrine, pulmonary, cardiac, gastrointestinal, genitourinary, musculoskeletal, integument and psychiatric systems are negative, except as otherwise noted.    Patient Active Problem List    Diagnosis Date Noted    EDIS (obstructive sleep apnea) 09/14/2022     Priority: Medium     PSG 8/25/2022  Weight 265 lbs BMI 41.6  No REM sleep achieved  AHI 13, RDI 17, Lowest O2 SAT 91%  Periodic limb movement index 150/h, PLM arousal index 19.7/h        Restless legs syndrome (RLS) 09/14/2022     Priority: Medium    Prediabetes 03/09/2022     Priority: Medium    Anxiety 08/26/2021     Priority: Medium    Morbid obesity (H) 08/26/2021     Priority: Medium    H/O partial thyroidectomy 08/29/2019     Priority: Medium      Past Medical History:   Diagnosis Date    Depressive disorder 1/1/2000    Fracture of right shoulder 2018    Fracture of right wrist     HAYES (generalized anxiety disorder)     Moderate episode of recurrent major depressive disorder (H)     Thyroid disease 2016    Partial thyroidectomy due to cyst    Uncomplicated asthma 2000    Allergy induced     Past Surgical History:   Procedure Laterality Date    COLONOSCOPY  11/5/2015    DILATION AND CURETTAGE  2016    ENT SURGERY  6/1/2016    Partial Thyroidectomy    THYROIDECTOMY   2016    Partial, doesn't require meds     Current Outpatient Medications   Medication Sig Dispense Refill    busPIRone (BUSPAR) 10 MG tablet Take 1 tablet (10 mg) by mouth 2 times daily 180 tablet 3    PARoxetine (PAXIL) 30 MG tablet Take 1 tablet (30 mg) by mouth every morning 90 tablet 3    Semaglutide-Weight Management (WEGOVY) 0.5 MG/0.5ML pen Inject 0.5 mg Subcutaneous once a week      vitamin D3 (CHOLECALCIFEROL) 125 MCG (5000 UT) tablet          Allergies   Allergen  "Reactions    Gluten Meal      intolerance    Latex      Blistering    Mold Cough and Other (See Comments)    Monosodium Glutamate GI Disturbance, Headache and Nausea and Vomiting    Pollen Extract Cough and Other (See Comments)     Seasonal allergies    Bupropion Anxiety        Social History     Tobacco Use    Smoking status: Former     Packs/day: 0.50     Years: 30.00     Additional pack years: 0.00     Total pack years: 15.00     Types: Cigarettes     Start date: 1980     Quit date: 2010     Years since quittin.8    Smokeless tobacco: Never    Tobacco comments:     Social smoker only   Substance Use Topics    Alcohol use: Yes     Alcohol/week: 7.0 standard drinks of alcohol     Types: 7 Glasses of wine per week     Comment: Had increased over the holidays but has now returned to norm     Family History   Problem Relation Age of Onset    Heart Disease Mother     Other - See Comments Mother         Lumpectomy    Hypertension Mother     Hyperlipidemia Mother     Breast Cancer Mother     Obesity Mother     Heart Disease Father     Heart Surgery Father         Triple    Hypertension Father     Hyperlipidemia Father     No Known Problems Sister     No Known Problems Sister     No Known Problems Sister     No Known Problems Sister     Breast Cancer Other         Aunt on mothers side    Depression Sister     Anxiety Disorder Sister     Depression Sister     Anxiety Disorder Sister     Obesity Sister      History   Drug Use No         Objective     /88 (BP Location: Right arm, Patient Position: Sitting, Cuff Size: Adult Large)   Pulse 86   Temp 98.4  F (36.9  C) (Tympanic)   Resp 20   Ht 1.679 m (5' 6.1\")   Wt 118.6 kg (261 lb 6.4 oz)   SpO2 97%   BMI 42.06 kg/m      Physical Exam    GENERAL APPEARANCE: healthy, alert and no distress     EYES: EOMI, PERRL     HENT: ear canals and TM's normal and nose and mouth without ulcers or lesions     NECK: no adenopathy, no asymmetry, masses, or scars and " thyroid normal to palpation     RESP: lungs clear to auscultation - no rales, rhonchi or wheezes     CV: regular rates and rhythm, normal S1 S2, no S3 or S4 and no murmur, click or rub     ABDOMEN:  soft, nontender, no HSM or masses and bowel sounds normal     MS: extremities normal- no gross deformities noted, no evidence of inflammation in joints, FROM in all extremities.     SKIN: no suspicious lesions or rashes     NEURO: Normal strength and tone, sensory exam grossly normal, mentation intact and speech normal     PSYCH: mentation appears normal. and affect normal/bright     LYMPHATICS: No cervical adenopathy    Recent Labs   Lab Test 03/24/23  0716 01/13/22  0805    141   POTASSIUM 3.7 4.6   CR 0.74 0.83   A1C 5.7* 5.7*      Diagnostics:  No labs were ordered during this visit.   No EKG required for low risk surgery (cataract, skin procedure, breast biopsy, etc).    Revised Cardiac Risk Index (RCRI):  The patient has the following serious cardiovascular risks for perioperative complications:   - No serious cardiac risks = 0 points     RCRI Interpretation: 0 points: Class I (very low risk - 0.4% complication rate)         Signed Electronically by: Leslie Delacruz NP  Copy of this evaluation report is provided to requesting physician.

## 2023-10-26 NOTE — PATIENT INSTRUCTIONS
Preparing for Your Surgery  Getting started  A nurse will call you to review your health history and instructions. They will give you an arrival time based on your scheduled surgery time. Please be ready to share:  Your doctor's clinic name and phone number  Your medical, surgical, and anesthesia history  A list of allergies and sensitivities  A list of medicines, including herbal treatments and over-the-counter drugs  Whether the patient has a legal guardian (ask how to send us the papers in advance)  Please tell us if you're pregnant--or if there's any chance you might be pregnant. Some surgeries may injure a fetus (unborn baby), so they require a pregnancy test. Surgeries that are safe for a fetus don't always need a test, and you can choose whether to have one.   If you have a child who's having surgery, please ask for a copy of Preparing for Your Child's Surgery.    Preparing for surgery  Within 10 to 30 days of surgery: Have a pre-op exam (sometimes called an H&P, or History and Physical). This can be done at a clinic or pre-operative center.  If you're having a , you may not need this exam. Talk to your care team.  At your pre-op exam, talk to your care team about all medicines you take. If you need to stop any medicines before surgery, ask when to start taking them again.  We do this for your safety. Many medicines can make you bleed too much during surgery. Some change how well surgery (anesthesia) drugs work.  Call your insurance company to let them know you're having surgery. (If you don't have insurance, call [phone].)  Call your clinic if there's any change in your health. This includes signs of a cold or flu (sore throat, runny nose, cough, rash, fever). It also includes a scrape or scratch near the surgery site.  If you have questions on the day of surgery, call your hospital or surgery center.  Eating and drinking guidelines  For your safety: Unless your surgeon tells you otherwise,  follow the guidelines below.  Eat and drink as usual until 8 hours before you arrive for surgery. After that, no food or milk.  Drink clear liquids until 2 hours before you arrive. These are liquids you can see through, like water, Gatorade, and Propel Water. They also include plain black coffee and tea (no cream or milk), candy, and breath mints. You can spit out gum when you arrive.  If you drink alcohol: Stop drinking it the night before surgery.  If your care team tells you to take medicine on the morning of surgery, it's okay to take it with a sip of water.  Preventing infection  Shower or bathe the night before and morning of your surgery. Follow the instructions your clinic gave you. (If no instructions, use regular soap.)  Don't shave or clip hair near your surgery site. We'll remove the hair if needed.  Don't smoke or vape the morning of surgery. You may chew nicotine gum up to 2 hours before surgery. A nicotine patch is okay.  Note: Some surgeries require you to completely quit smoking and nicotine. Check with your surgeon.  Your care team will make every effort to keep you safe from infection. We will:  Clean our hands often with soap and water (or an alcohol-based hand rub).  Clean the skin at your surgery site with a special soap that kills germs.  Give you a special gown to keep you warm. (Cold raises the risk of infection.)  Wear special hair covers, masks, gowns and gloves during surgery.  Give antibiotic medicine, if prescribed. Not all surgeries need antibiotics.  What to bring on the day of surgery  Photo ID and insurance card  Copy of your health care directive, if you have one  Glasses and hearing aids (bring cases)  You can't wear contacts during surgery  Inhaler and eye drops, if you use them (tell us about these when you arrive)  CPAP machine or breathing device, if you use them  A few personal items, if spending the night  If you have . . .  A pacemaker, ICD (cardiac defibrillator) or other  implant: Bring the ID card.  An implanted stimulator: Bring the remote control.  A legal guardian: Bring a copy of the certified (court-stamped) guardianship papers.  Please remove any jewelry, including body piercings. Leave jewelry and other valuables at home.  If you're going home the day of surgery  You must have a responsible adult drive you home. They should stay with you overnight as well.  If you don't have someone to stay with you, and you aren't safe to go home alone, we may keep you overnight. Insurance often won't pay for this.  After surgery  If it's hard to control your pain or you need more pain medicine, please call your surgeon's office.  Questions?   If you have any questions for your care team, list them here: _________________________________________________________________________________________________________________________________________________________________________ ____________________________________ ____________________________________ ____________________________________  For informational purposes only. Not to replace the advice of your health care provider. Copyright   2003, 2019 [place] [facility] [facility]. All rights reserved. Clinically reviewed by Jes Steve MD. [employer]ART[employer] 702205 - REV 12/22.    How to Take Your Medication Before Surgery  - Take all of your medications before surgery as usual

## 2023-11-14 ENCOUNTER — TRANSFERRED RECORDS (OUTPATIENT)
Dept: HEALTH INFORMATION MANAGEMENT | Facility: CLINIC | Age: 58
End: 2023-11-14
Payer: COMMERCIAL

## 2023-12-06 ENCOUNTER — OFFICE VISIT (OUTPATIENT)
Dept: URGENT CARE | Facility: URGENT CARE | Age: 58
End: 2023-12-06
Payer: COMMERCIAL

## 2023-12-06 VITALS
OXYGEN SATURATION: 98 % | SYSTOLIC BLOOD PRESSURE: 148 MMHG | DIASTOLIC BLOOD PRESSURE: 88 MMHG | TEMPERATURE: 98 F | HEART RATE: 78 BPM | RESPIRATION RATE: 20 BRPM

## 2023-12-06 DIAGNOSIS — J02.9 VIRAL PHARYNGITIS: Primary | ICD-10-CM

## 2023-12-06 DIAGNOSIS — J06.9 VIRAL URI WITH COUGH: ICD-10-CM

## 2023-12-06 LAB
DEPRECATED S PYO AG THROAT QL EIA: NEGATIVE
FLUAV AG SPEC QL IA: NEGATIVE
FLUBV AG SPEC QL IA: NEGATIVE
GROUP A STREP BY PCR: NOT DETECTED

## 2023-12-06 PROCEDURE — 99213 OFFICE O/P EST LOW 20 MIN: CPT | Performed by: PHYSICIAN ASSISTANT

## 2023-12-06 PROCEDURE — 87804 INFLUENZA ASSAY W/OPTIC: CPT | Performed by: PHYSICIAN ASSISTANT

## 2023-12-06 PROCEDURE — 87651 STREP A DNA AMP PROBE: CPT | Performed by: PHYSICIAN ASSISTANT

## 2023-12-06 RX ORDER — SENNOSIDES AND DOCUSATE SODIUM 8.6; 5 MG/1; MG/1
TABLET ORAL
COMMUNITY
Start: 2023-10-30 | End: 2024-06-05

## 2023-12-06 RX ORDER — HYDROCODONE BITARTRATE AND ACETAMINOPHEN 5; 325 MG/1; MG/1
TABLET ORAL
COMMUNITY
Start: 2023-10-30 | End: 2023-12-06

## 2023-12-06 RX ORDER — HYDROXYZINE HYDROCHLORIDE 25 MG/1
1 TABLET, FILM COATED ORAL
COMMUNITY
Start: 2023-10-30 | End: 2024-06-05

## 2023-12-06 NOTE — PROGRESS NOTES
Assessment & Plan     1. Viral pharyngitis  - Streptococcus A Rapid Screen w/Reflex to PCR  - Influenza A/B antigen  - Group A Streptococcus PCR Throat Swab    2. Viral URI with cough        This patient presented with sore throat and clinical evidence of pharyngitis.  The rapid strep test is negative. There is no clinical evidence of  peritonsillar abscess, retropharyngeal abscess, Lemierre's Syndrome, epiglottis, or Medhat's angina.  I suspect the patient has a viral URI.  Lungs are clear to auscultation bilaterally without wheezing, rhonchi or rales..  TMs clear bilaterally.  Encouraged supportive cares, fluids, rest, Tylenol / Ibuprofen for comfort.     The patient understands the need to return to the clinic or present to the ER with increasing pain, change in voice, neck pain, vomiting, inability to take fluids or shortness of breath.       [facility]    CHIEF COMPLAINT:   Chief Complaint   Patient presents with    Pharyngitis     Sore throat/chills/fever since Sunday night      Ivonne Melendez is a 58 year old female who presents to clinic today for evaluation of sore throat. Symptoms started 3 days ago with sore throat and some cough and chest congestion. Feeling feverish, since resolved. Coughing up phlegm. No chest pain or shortness of breath.   Ill contacts at work. At home COVID test negative.       Past Medical History:   Diagnosis Date    Depressive disorder 1/1/2000    Fracture of right shoulder 2018    Fracture of right wrist     HAYES (generalized anxiety disorder)     Moderate episode of recurrent major depressive disorder (H)     Thyroid disease 2016    Partial thyroidectomy due to cyst    Uncomplicated asthma 2000    Allergy induced     Past Surgical History:   Procedure Laterality Date    COLONOSCOPY  11/5/2015    DILATION AND CURETTAGE  2016    ENT SURGERY  6/1/2016    Partial Thyroidectomy    THYROIDECTOMY   2016    Partial, doesn't require  meds     Social History     Tobacco Use    Smoking status: Former     Packs/day: 0.50     Years: 30.00     Additional pack years: 0.00     Total pack years: 15.00     Types: Cigarettes     Start date: 1980     Quit date: 2010     Years since quittin.9    Smokeless tobacco: Never    Tobacco comments:     Social smoker only   Substance Use Topics    Alcohol use: Yes     Alcohol/week: 7.0 standard drinks of alcohol     Types: 7 Glasses of wine per week     Comment: Had increased over the holidays but has now returned to norm     Current Outpatient Medications   Medication    busPIRone (BUSPAR) 10 MG tablet    hydrOXYzine HCl (ATARAX) 25 MG tablet    PARoxetine (PAXIL) 30 MG tablet    Semaglutide-Weight Management (WEGOVY) 0.5 MG/0.5ML pen    SENNA-PLUS 8.6-50 MG tablet    vitamin D3 (CHOLECALCIFEROL) 125 MCG (5000 UT) tablet     No current facility-administered medications for this visit.     Allergies   Allergen Reactions    Gluten Meal      intolerance    Latex      Blistering    Mold Cough and Other (See Comments)    Monosodium Glutamate GI Disturbance, Headache and Nausea and Vomiting    Pollen Extract Cough and Other (See Comments)     Seasonal allergies    Bupropion Anxiety       10 point ROS of systems were all negative except for pertinent positives noted in my HPI.      Exam:   BP (!) 148/88   Pulse 78   Temp 98  F (36.7  C) (Tympanic)   Resp 20   SpO2 98%   Constitutional: healthy, alert and no distress  Head: Normocephalic, atraumatic.  Eyes: conjunctiva clear, no drainage  ENT: TMs clear and shiny ghada, nasal mucosa pink and moist, throat erythematous without trismus or drooling.   Neck: neck is supple, no cervical lymphadenopathy or nuchal rigidity  Cardiovascular: RRR  Respiratory: CTA bilaterally, no rhonchi or rales  Skin: no rashes  Neurologic: Speech clear, gait normal. Moves all extremities.    Results for orders placed or performed in visit on 23   Streptococcus A Rapid Screen  w/Reflex to PCR     Status: Normal    Specimen: Throat; Swab   Result Value Ref Range    Group A Strep antigen Negative Negative   Influenza A/B antigen     Status: Normal    Specimen: Nose; Swab   Result Value Ref Range    Influenza A antigen Negative Negative    Influenza B antigen Negative Negative    Narrative    Test results must be correlated with clinical data. If necessary, results should be confirmed by a molecular assay or viral culture.

## 2023-12-06 NOTE — PATIENT INSTRUCTIONS
Mucinex (guaifenesin -- plain) for the cough     Push fluids and rest    Warm steamy shower before bed    Salt water gargles for sore throat    Ibuprofen dose to take 600mg two times per day

## 2023-12-06 NOTE — LETTER
December 6, 2023      Nora Og  [address] [place] [address]        To Whom It May Concern:    Nora Og was seen in our clinic. Please excuse from work 12/4/23 - 12/8/23. She may return to work sooner if symptoms improve / resolve.       Sincerely,        Tiny Shah PA-C

## 2023-12-06 NOTE — LETTER
December 6, 2023      Nora Og  [address] [place] [address]        To Whom It May Concern:    Nora Og was seen in our clinic. She may return to work with the following: {work restrictions for clinic work note:586001} on or about ***.      Sincerely,        Tiny Shah PA-C

## 2023-12-10 ENCOUNTER — OFFICE VISIT (OUTPATIENT)
Dept: URGENT CARE | Facility: URGENT CARE | Age: 58
End: 2023-12-10
Payer: COMMERCIAL

## 2023-12-10 VITALS
TEMPERATURE: 98 F | OXYGEN SATURATION: 100 % | DIASTOLIC BLOOD PRESSURE: 82 MMHG | HEART RATE: 81 BPM | SYSTOLIC BLOOD PRESSURE: 126 MMHG | RESPIRATION RATE: 16 BRPM

## 2023-12-10 DIAGNOSIS — H66.013 NON-RECURRENT ACUTE SUPPURATIVE OTITIS MEDIA OF BOTH EARS WITH SPONTANEOUS RUPTURE OF TYMPANIC MEMBRANES: Primary | ICD-10-CM

## 2023-12-10 DIAGNOSIS — J01.90 ACUTE SINUSITIS WITH COEXISTING CONDITION REQUIRING PROPHYLACTIC TREATMENT: ICD-10-CM

## 2023-12-10 PROCEDURE — 99214 OFFICE O/P EST MOD 30 MIN: CPT | Performed by: FAMILY MEDICINE

## 2023-12-10 NOTE — PROGRESS NOTES
SUBJECTIVE:   Nora Og is a 58 year old female presenting with a chief complaint of bilateral ear pain, plugged, ringing and sinus congestion.  Onset of symptoms was 1 week(s) ago.  Course of illness is worsening.    Severity moderate  Current and Associated symptoms: bilateral ear pain, sinus congestion  Treatment measures tried include Tylenol/Ibuprofen, nasal lavage, Fluids, and Rest.  Predisposing factors include Hx sinus, asthma.    Negative for strep and COVID  Body aches improved but then developed more left ear pain on Friday, now right is also painful.    Had fever earlier but has resolved.  Has sinus issues, denies prior ear issues.    Past Medical History:   Diagnosis Date    Depressive disorder 2000    Fracture of right shoulder 2018    Fracture of right wrist     HAYES (generalized anxiety disorder)     Moderate episode of recurrent major depressive disorder (H)     Thyroid disease 2016    Partial thyroidectomy due to cyst    Uncomplicated asthma 2000    Allergy induced     Current Outpatient Medications   Medication Sig Dispense Refill    busPIRone (BUSPAR) 10 MG tablet Take 1 tablet (10 mg) by mouth 2 times daily 180 tablet 3    hydrOXYzine HCl (ATARAX) 25 MG tablet Take 1 tablet by mouth 3 times daily      PARoxetine (PAXIL) 30 MG tablet Take 1 tablet (30 mg) by mouth every morning 90 tablet 3    Semaglutide-Weight Management (WEGOVY) 0.5 MG/0.5ML pen Inject 0.5 mg Subcutaneous once a week      SENNA-PLUS 8.6-50 MG tablet TAKE ONE TABLET BY MOUTH EVERY DAY WHILE USING NORCO TO PREVENT CONSTIPATION.      vitamin D3 (CHOLECALCIFEROL) 125 MCG (5000 UT) tablet        Social History     Tobacco Use    Smoking status: Former     Packs/day: 0.50     Years: 30.00     Additional pack years: 0.00     Total pack years: 15.00     Types: Cigarettes     Start date: 1980     Quit date: 2010     Years since quittin.9    Smokeless tobacco: Never    Tobacco comments:     Social smoker only    Substance Use Topics    Alcohol use: Yes     Alcohol/week: 7.0 standard drinks of alcohol     Types: 7 Glasses of wine per week     Comment: Had increased over the holidays but has now returned to norm       ROS:  Review of systems negative except as stated above.    OBJECTIVE:  /82   Pulse 81   Temp 98  F (36.7  C)   Resp 16   SpO2 100%   GENERAL APPEARANCE: healthy, alert and no distress  EYES: EOMI,  PERRL, conjunctiva clear  HENT: bilateral ear canals normal, right TM - opaque, mild pink.  Left TM - bulging, opaque, faint pink with small amount of purulent oozing in ear canal  RESP: lungs with no audible wheezes or increase work of breathing  PSYCH: mentation appears normal and affect normal/bright    ASSESSMENT/PLAN:  (H66.013) Non-recurrent acute suppurative otitis media of both ears with spontaneous rupture of tympanic membranes  (primary encounter diagnosis)  Comment: left with small TM perforation  Plan: amoxicillin-clavulanate (AUGMENTIN) 875-125 MG         tablet            (J01.90) Acute sinusitis with coexisting condition requiring prophylactic treatment  Plan: amoxicillin-clavulanate (AUGMENTIN) 875-125 MG         tablet            Reassurance given, reviewed symptomatic treatment with tylenol, ibuprofen, plenty of fluids and rest.  RX Augmentin given for sinus infection and bilateral acute ear infection.  Discussed that left ear has small amount of drainage and most likely has a small TM perforation.      Follow up with primary provider for ear recheck in 2 weeks    Jason Vera MD  December 10, 2023 11:55 AM

## 2023-12-13 ENCOUNTER — ANCILLARY PROCEDURE (OUTPATIENT)
Dept: MAMMOGRAPHY | Facility: CLINIC | Age: 58
End: 2023-12-13
Attending: NURSE PRACTITIONER
Payer: COMMERCIAL

## 2023-12-13 DIAGNOSIS — Z12.31 ENCOUNTER FOR SCREENING MAMMOGRAM FOR BREAST CANCER: ICD-10-CM

## 2023-12-13 PROCEDURE — 77067 SCR MAMMO BI INCL CAD: CPT | Mod: TC | Performed by: RADIOLOGY

## 2023-12-13 PROCEDURE — 77063 BREAST TOMOSYNTHESIS BI: CPT | Mod: TC | Performed by: RADIOLOGY

## 2023-12-14 ENCOUNTER — TRANSFERRED RECORDS (OUTPATIENT)
Dept: HEALTH INFORMATION MANAGEMENT | Facility: CLINIC | Age: 58
End: 2023-12-14
Payer: COMMERCIAL

## 2024-02-22 ENCOUNTER — TRANSFERRED RECORDS (OUTPATIENT)
Dept: HEALTH INFORMATION MANAGEMENT | Facility: CLINIC | Age: 59
End: 2024-02-22
Payer: COMMERCIAL

## 2024-02-23 ENCOUNTER — PATIENT OUTREACH (OUTPATIENT)
Dept: CARE COORDINATION | Facility: CLINIC | Age: 59
End: 2024-02-23
Payer: COMMERCIAL

## 2024-03-08 ENCOUNTER — PATIENT OUTREACH (OUTPATIENT)
Dept: CARE COORDINATION | Facility: CLINIC | Age: 59
End: 2024-03-08
Payer: COMMERCIAL

## 2024-03-15 ENCOUNTER — DOCUMENTATION ONLY (OUTPATIENT)
Dept: ONCOLOGY | Facility: CLINIC | Age: 59
End: 2024-03-15
Payer: COMMERCIAL

## 2024-03-15 NOTE — PROGRESS NOTES
March 15, 2024    I previously met with Nora on 4/5/2022 for genetic counseling, at which time Nora elected to proceed with genetic testing through [facility]. Per [facility], they have not received a sample to complete Nora's genetic testing and the order has been cancelled.    I would be happy to meet with Nora again if she would like to readdress genetic testing.    Anca Duncan MS, [facility]  Licensed, Certified Genetic Counselor  Office: [phone]  [email or web address]

## 2024-03-23 ENCOUNTER — TRANSFERRED RECORDS (OUTPATIENT)
Dept: HEALTH INFORMATION MANAGEMENT | Facility: CLINIC | Age: 59
End: 2024-03-23
Payer: COMMERCIAL

## 2024-04-08 DIAGNOSIS — F41.1 GAD (GENERALIZED ANXIETY DISORDER): ICD-10-CM

## 2024-04-08 NOTE — TELEPHONE ENCOUNTER
Medication Question or Refill    Contacts         Type Contact Phone/Fax    04/08/2024 09:02 AM CDT Phone (Incoming) Nora Og (Self) [phone] (H)            What medication are you calling about (include dose and sig)?: paxel and euspirone    Preferred Pharmacy:   [id number] - [place], [place] - [address]  [address] [address]  Phone: [phone] Fax: [phone]      Controlled Substance Agreement on file:   CSA -- Patient Level:    CSA: None found at the patient level.       Who prescribed the medication?: pcp    Do you need a refill? Yes    When did you use the medication last? today    Patient offered an appointment? Yes: pt has appt scheduled for June 5th    Do you have any questions or concerns?  No      Could we send this information to you in [facility] or would you prefer to receive a phone call?:   Patient would prefer a phone call   Okay to leave a detailed message?: Yes at Home number on file [phone] (home)

## 2024-04-09 RX ORDER — BUSPIRONE HYDROCHLORIDE 10 MG/1
10 TABLET ORAL 2 TIMES DAILY
Qty: 180 TABLET | Refills: 0 | Status: SHIPPED | OUTPATIENT
Start: 2024-04-09 | End: 2024-06-05

## 2024-04-09 RX ORDER — PAROXETINE 30 MG/1
30 TABLET, FILM COATED ORAL EVERY MORNING
Qty: 90 TABLET | Refills: 0 | Status: SHIPPED | OUTPATIENT
Start: 2024-04-09 | End: 2024-06-05

## 2024-04-11 ENCOUNTER — MYC MEDICAL ADVICE (OUTPATIENT)
Dept: PEDIATRICS | Facility: CLINIC | Age: 59
End: 2024-04-11
Payer: COMMERCIAL

## 2024-04-11 DIAGNOSIS — E66.01 CLASS 3 SEVERE OBESITY DUE TO EXCESS CALORIES WITHOUT SERIOUS COMORBIDITY WITH BODY MASS INDEX (BMI) OF 40.0 TO 44.9 IN ADULT (H): ICD-10-CM

## 2024-04-11 DIAGNOSIS — E66.813 CLASS 3 SEVERE OBESITY DUE TO EXCESS CALORIES WITHOUT SERIOUS COMORBIDITY WITH BODY MASS INDEX (BMI) OF 40.0 TO 44.9 IN ADULT (H): ICD-10-CM

## 2024-04-11 DIAGNOSIS — R73.03 PREDIABETES: Primary | ICD-10-CM

## 2024-04-16 ENCOUNTER — TRANSFERRED RECORDS (OUTPATIENT)
Dept: HEALTH INFORMATION MANAGEMENT | Facility: CLINIC | Age: 59
End: 2024-04-16
Payer: COMMERCIAL

## 2024-04-23 ENCOUNTER — TRANSFERRED RECORDS (OUTPATIENT)
Dept: HEALTH INFORMATION MANAGEMENT | Facility: CLINIC | Age: 59
End: 2024-04-23
Payer: COMMERCIAL

## 2024-05-05 ENCOUNTER — HEALTH MAINTENANCE LETTER (OUTPATIENT)
Age: 59
End: 2024-05-05

## 2024-05-23 ENCOUNTER — TRANSFERRED RECORDS (OUTPATIENT)
Dept: HEALTH INFORMATION MANAGEMENT | Facility: CLINIC | Age: 59
End: 2024-05-23
Payer: COMMERCIAL

## 2024-06-02 SDOH — HEALTH STABILITY: PHYSICAL HEALTH: ON AVERAGE, HOW MANY DAYS PER WEEK DO YOU ENGAGE IN MODERATE TO STRENUOUS EXERCISE (LIKE A BRISK WALK)?: 0 DAYS

## 2024-06-02 SDOH — HEALTH STABILITY: PHYSICAL HEALTH: ON AVERAGE, HOW MANY MINUTES DO YOU ENGAGE IN EXERCISE AT THIS LEVEL?: 0 MIN

## 2024-06-02 ASSESSMENT — SOCIAL DETERMINANTS OF HEALTH (SDOH): HOW OFTEN DO YOU GET TOGETHER WITH FRIENDS OR RELATIVES?: ONCE A WEEK

## 2024-06-05 ENCOUNTER — OFFICE VISIT (OUTPATIENT)
Dept: PEDIATRICS | Facility: CLINIC | Age: 59
End: 2024-06-05
Payer: COMMERCIAL

## 2024-06-05 VITALS
HEIGHT: 67 IN | TEMPERATURE: 97 F | WEIGHT: 274 LBS | DIASTOLIC BLOOD PRESSURE: 74 MMHG | OXYGEN SATURATION: 98 % | SYSTOLIC BLOOD PRESSURE: 130 MMHG | HEART RATE: 91 BPM | BODY MASS INDEX: 43 KG/M2 | RESPIRATION RATE: 16 BRPM

## 2024-06-05 DIAGNOSIS — J30.1 NON-SEASONAL ALLERGIC RHINITIS DUE TO POLLEN: ICD-10-CM

## 2024-06-05 DIAGNOSIS — Z00.00 ENCOUNTER FOR PREVENTATIVE ADULT HEALTH CARE EXAMINATION: Primary | ICD-10-CM

## 2024-06-05 DIAGNOSIS — F41.1 GAD (GENERALIZED ANXIETY DISORDER): ICD-10-CM

## 2024-06-05 DIAGNOSIS — M79.672 LEFT FOOT PAIN: ICD-10-CM

## 2024-06-05 DIAGNOSIS — E66.01 MORBID OBESITY (H): ICD-10-CM

## 2024-06-05 DIAGNOSIS — Z78.0 ASYMPTOMATIC POSTMENOPAUSAL ESTROGEN DEFICIENCY: ICD-10-CM

## 2024-06-05 PROCEDURE — 99396 PREV VISIT EST AGE 40-64: CPT | Performed by: NURSE PRACTITIONER

## 2024-06-05 PROCEDURE — 99213 OFFICE O/P EST LOW 20 MIN: CPT | Mod: 25 | Performed by: NURSE PRACTITIONER

## 2024-06-05 RX ORDER — PAROXETINE 30 MG/1
30 TABLET, FILM COATED ORAL EVERY MORNING
Qty: 90 TABLET | Refills: 3 | Status: SHIPPED | OUTPATIENT
Start: 2024-06-05

## 2024-06-05 RX ORDER — BUSPIRONE HYDROCHLORIDE 10 MG/1
10 TABLET ORAL 2 TIMES DAILY
Qty: 180 TABLET | Refills: 3 | Status: SHIPPED | OUTPATIENT
Start: 2024-06-05

## 2024-06-05 ASSESSMENT — PAIN SCALES - GENERAL: PAINLEVEL: NO PAIN (0)

## 2024-06-05 NOTE — PROGRESS NOTES
"Preventive Care Visit  [facility] ABHINAV Cardoza CNP, Family Medicine  Jun 5, 2024      Assessment & Plan     Encounter for preventative adult health care examination  Age appropriate screening and preventative care have been addressed today. Vaccinations have been reviewed and are up to date. Recommend annual vision exams as well as biannual dental exams. They will follow up for annual physical again in one year.   - Colonoscopy in 2015 in [place], results reportedly normal, repeat in 2025.   - Mammo - utd  - Pap - utd  - DEXA - never done    Asymptomatic postmenopausal estrogen deficiency  - DX Bone Density; Future    Morbid obesity (H)  Body mass index is 43.56 kg/m . Unfortunately not able to be as physically active as she would like due to her ongoing left foot pain.     HAYES (generalized anxiety disorder)  Chronic, stable. Refilled.   - PARoxetine (PAXIL) 30 MG tablet; Take 1 tablet (30 mg) by mouth every morning  - busPIRone (BUSPAR) 10 MG tablet; Take 1 tablet (10 mg) by mouth 2 times daily  - OFFICE/OUTPT VISIT,EST,LEVL III     Non-seasonal allergic rhinitis due to pollen  Managing with azelastine nasal spray currently, flonase not effective in the past. Recommend restarting xyzal. Referral to allergy.   - Adult Allergy/Asthma  Referral; Future    Left foot pain  Continue follow-up with [facility]. She will schedule follow-up.             BMI  Estimated body mass index is 43.56 kg/m  as calculated from the following:    Height as of this encounter: 1.689 m (5' 6.5\").    Weight as of this encounter: 124.3 kg (274 lb).   Weight management plan: Discussed healthy diet and exercise guidelines    Counseling  Appropriate preventive services were discussed with this patient, including applicable screening as appropriate for fall prevention, nutrition, physical activity, Tobacco-use cessation, weight loss and cognition.  Checklist reviewing preventive services available has been " given to the patient.  Reviewed patient's diet, addressing concerns and/or questions.       Subjective   Nora is a 59 year old, presenting for the following:  Physical        6/5/2024     7:08 AM   Additional Questions   Roomed by Salma SEVERINO CMA   Accompanied by Self         6/5/2024     7:08 AM   Patient Reported Additional Medications   Patient reports taking the following new medications n/a        Health Care Directive  Patient does not have a Health Care Directive or Living Will: Discussed advance care planning with patient; however, patient declined at this time.    HPI    - Colonoscopy in 2015 in [place], results reportedly normal, repeat in 2025.   - Mammo - utd  - Pap - utd    Left foot pain since her surgery 10/30/2023, hx haglunds deformity of left ankle. Her pain prevents her from being physically active and therefore she has gained weight and has some back pain.      Needs right knee replaced but was told she needed to lose weight first. Has had two injections and they typically provide relief for a year.     Started wegovy last summer, was on it x 3 months had lost 15 pounds. Then had foot surgery and was off it x 2 weeks. The place where she was getting the wegovy was in [place] and she couldn't get out there. Found a compounding pharmacy and has been back on it a couple months without weight loss but is on low dose and intends to continue titrating.     Allergies - xyzal in the evenings, azelastine nasal spray. Had stopped the xyzal because she was doing well with the azelastine alone but the past month her allergies have been really bad. Nasal drainage, upset stomach in the am as a result.         6/2/2024   General Health   How would you rate your overall physical health? (!) FAIR   Feel stress (tense, anxious, or unable to sleep) To some extent   (!) STRESS CONCERN      6/2/2024   Nutrition   Three or more servings of calcium each day? (!) NO   Diet: Gluten-free/reduced   How many  servings of fruit and vegetables per day? (!) 2-3   How many sweetened beverages each day? 0-1         2024   Exercise   Days per week of moderate/strenous exercise 0 days   Average minutes spent exercising at this level 0 min   (!) EXERCISE CONCERN      2024   Social Factors   Frequency of gathering with friends or relatives Once a week   Worry food won't last until get money to buy more No   Food not last or not have enough money for food? No   Do you have housing?  Yes   Are you worried about losing your housing? No   Lack of transportation? No   Unable to get utilities (heat,electricity)? No         2024   Fall Risk   Fallen 2 or more times in the past year? No   Trouble with walking or balance? No          2024   Dental   Dentist two times every year? Yes         2024   TB Screening   Were you born outside of the [place]? No         Today's PHQ-2 Score:       2024     6:41 AM   PHQ-2 (  [employer])   Q1: Little interest or pleasure in doing things 1   Q2: Feeling down, depressed or hopeless 1   PHQ-2 Score 2   Q1: Little interest or pleasure in doing things Several days   Q2: Feeling down, depressed or hopeless Several days   PHQ-2 Score 2           2024   Substance Use   Alcohol more than 3/day or more than 7/wk No   Do you use any other substances recreationally? No     Social History     Tobacco Use    Smoking status: Former     Current packs/day: 0.00     Average packs/day: 0.5 packs/day for 30.0 years (15.0 ttl pk-yrs)     Types: Cigarettes     Start date: 1980     Quit date: 2010     Years since quittin.4     Passive exposure: Never    Smokeless tobacco: Never    Tobacco comments:     Social smoker only   Vaping Use    Vaping status: Never Used   Substance Use Topics    Alcohol use: Yes     Alcohol/week: 7.0 standard drinks of alcohol     Types: 7 Glasses of wine per week     Comment: Had increased over the holidays but has now returned to norm    Drug use: No            12/13/2023   LAST FHS-7 RESULTS   1st degree relative breast or ovarian cancer Yes   Any relative bilateral breast cancer No   Any male have breast cancer No   Any ONE woman have BOTH breast AND ovarian cancer No   Any woman with breast cancer before 50yrs No   2 or more relatives with breast AND/OR ovarian cancer No   2 or more relatives with breast AND/OR bowel cancer No        Mammogram Screening - Mammogram every 1-2 years updated in Health Maintenance based on mutual decision making        6/2/2024   STI Screening   New sexual partner(s) since last STI/HIV test? No     History of abnormal Pap smear: No - age 30- 64 PAP with HPV every 5 years recommended        Latest Ref Rng & Units 3/24/2023     7:07 AM   PAP / HPV   PAP  Negative for Intraepithelial Lesion or Malignancy (NILM)    HPV 16 DNA Negative Negative    HPV 18 DNA Negative Negative    Other HR HPV Negative Negative      ASCVD Risk   The 10-year ASCVD risk score (Juan ROSALES, et al., 2019) is: 5.2%    Values used to calculate the score:      Age: 59 years      Sex: Female      Is Non- : No      Diabetic: No      Tobacco smoker: No      Systolic Blood Pressure: 130 mmHg      Is BP treated: No      HDL Cholesterol: 44 mg/dL      Total Cholesterol: 261 mg/dL           Reviewed and updated as needed this visit by Provider                    Patient Active Problem List   Diagnosis    Anxiety    Morbid obesity (H)    Prediabetes    H/O partial thyroidectomy    EDIS (obstructive sleep apnea)    Restless legs syndrome (RLS)     Past Surgical History:   Procedure Laterality Date    COLONOSCOPY  11/5/2015    DILATION AND CURETTAGE  2016    ENT SURGERY  6/1/2016    Partial Thyroidectomy    ORTHOPEDIC SURGERY  10/30/2023, 5/2024    Mary's Deformity and Achilles repair, Surgical repair left Mindle Finger    THYROIDECTOMY   2016    Partial, doesn't require meds       Social History     Tobacco Use    Smoking status: Former      "Current packs/day: 0.00     Average packs/day: 0.5 packs/day for 30.0 years (15.0 ttl pk-yrs)     Types: Cigarettes     Start date: 1980     Quit date: 2010     Years since quittin.4     Passive exposure: Never    Smokeless tobacco: Never    Tobacco comments:     Social smoker only   Substance Use Topics    Alcohol use: Yes     Alcohol/week: 7.0 standard drinks of alcohol     Types: 7 Glasses of wine per week     Comment: Had increased over the holidays but has now returned to norm     Family History   Problem Relation Age of Onset    Heart Disease Mother     Other - See Comments Mother         Lumpectomy    Hypertension Mother     Hyperlipidemia Mother     Breast Cancer Mother     Obesity Mother     Heart Disease Father     Heart Surgery Father         Triple    Hypertension Father     Hyperlipidemia Father     No Known Problems Sister     No Known Problems Sister     No Known Problems Sister     No Known Problems Sister     Breast Cancer Other         Aunt on mothers side    Depression Sister     Anxiety Disorder Sister     Depression Sister     Anxiety Disorder Sister     Obesity Sister               Objective    Exam  /74   Pulse 91   Temp 97  F (36.1  C) (Tympanic)   Resp 16   Ht 1.689 m (5' 6.5\")   Wt 124.3 kg (274 lb)   SpO2 98%   BMI 43.56 kg/m     Estimated body mass index is 43.56 kg/m  as calculated from the following:    Height as of this encounter: 1.689 m (5' 6.5\").    Weight as of this encounter: 124.3 kg (274 lb).    Physical Exam  Constitutional: appears to be in no acute distress, comfortable, pleasant.   Eyes: anicteric, conjunctiva clear without drainage or erythema. AUSTYN.   Ears, Nose and Throat: tympanic membranes gray with LR,  nose without nasal discharge. OP: no erythema to posterior pharynx, negative post nasal drainage, tonsils +1 no erythema or exudate.  Neck: supple, thyroid palpable,not enlarged, no nodules   Breast: Exam deferred (deferred after discussion of " exam options with patient, no symptoms or concerns).   Cardiovascular: regular rate and rhythm, normal S1 and S2, no murmurs, rubs or gallops, peripheral pulses full and symmetric; negative peripheral edema   Respiratory: Air entry throughout. Breathing pattern unlabored without the use of accessory muscles. Clear to auscultation A and P, no wheezes or crackles, normal breath sounds.    Gastrointestinal: rounded, soft. Positive bowel sounds x4, nontender, no masses.   Genitourinary: Exam deferred (deferred after discussion of exam options with patient, no symptoms or concerns, pap is up to date).   Musculoskeletal: full range of motion, no edema.   Skin: pink, turgor smooth and elastic. Negative for lesions or dryness.  Neurological: normal gait, no tremor.   Psychological: appropriate mood and affect.   Lymphatic: no cervical, axillary, supraclavicular, or infraclavicular lymphadenopathy.          Signed Electronically by: ABHINAV Magallanes CNP

## 2024-06-25 ENCOUNTER — TRANSFERRED RECORDS (OUTPATIENT)
Dept: HEALTH INFORMATION MANAGEMENT | Facility: CLINIC | Age: 59
End: 2024-06-25

## 2024-07-09 ENCOUNTER — OFFICE VISIT (OUTPATIENT)
Dept: PEDIATRICS | Facility: CLINIC | Age: 59
End: 2024-07-09
Payer: COMMERCIAL

## 2024-07-09 VITALS
SYSTOLIC BLOOD PRESSURE: 130 MMHG | HEIGHT: 67 IN | HEART RATE: 112 BPM | BODY MASS INDEX: 43 KG/M2 | RESPIRATION RATE: 16 BRPM | WEIGHT: 274 LBS | DIASTOLIC BLOOD PRESSURE: 80 MMHG | OXYGEN SATURATION: 97 % | TEMPERATURE: 97 F

## 2024-07-09 DIAGNOSIS — Z01.818 PREOP GENERAL PHYSICAL EXAM: Primary | ICD-10-CM

## 2024-07-09 DIAGNOSIS — E66.01 MORBID OBESITY (H): ICD-10-CM

## 2024-07-09 DIAGNOSIS — M79.672 PAIN OF LEFT HEEL: ICD-10-CM

## 2024-07-09 DIAGNOSIS — G47.33 OSA (OBSTRUCTIVE SLEEP APNEA): ICD-10-CM

## 2024-07-09 DIAGNOSIS — M67.874 ACHILLES TENDINOSIS OF LEFT ANKLE: ICD-10-CM

## 2024-07-09 PROCEDURE — 99214 OFFICE O/P EST MOD 30 MIN: CPT | Performed by: PHYSICIAN ASSISTANT

## 2024-07-09 ASSESSMENT — PAIN SCALES - GENERAL: PAINLEVEL: NO PAIN (0)

## 2024-07-09 NOTE — PROGRESS NOTES
Preoperative Evaluation  [facility] [place]  [address]  [address]  [place] [address]  Phone: [phone]  Fax: [phone]  Primary Provider: ABHINAV Magallanes CNP  Pre-op Performing Provider: Jen Patel PA-C  Jul 9, 2024 7/5/2024   Surgical Information   What procedure is being done? Achilles repair   Facility or Hospital where procedure/surgery will be performed: [facility] [place]   Who is doing the procedure / surgery? Dr. Wetzel   Date of surgery / procedure: 07/17/2024   Time of surgery / procedure: TBD   Where do you plan to recover after surgery? at home with family        Fax number for surgical facility: [phone]    Assessment & Plan     The proposed surgical procedure is considered INTERMEDIATE risk.    Preop general physical exam  Pain of left heel  Achilles tendinosis of left ankle  Continue to follow-up with [facility]. Had Mary's excision and Achilles Debridement in October. Continues to have Achilles Tendinosis.     EDIS (obstructive sleep apnea)  CPAP machine    Morbid obesity (H)  Has been on and off Wegovy. Currently on 0.5mg. Will hold dose 1 week prior to surgery.     - No identified additional risk factors other than previously addressed    Preoperative Medication Instructions  Antiplatelet or Anticoagulation Medication Instructions   - Patient is on no antiplatelet or anticoagulation medications.    Additional Medication Instructions   - SSRIs, SNRIs, TCAs, Antipsychotics: Continue without modification.    - GLP-1 Injectable (exenitide, liraglutide, semaglutide, dulaglutide, etc.): DO NOT TAKE 7 days before surgery     Recommendation  Approval given to proceed with proposed procedure, without further diagnostic evaluation.    Ivonne Melendez is a 59 year old, presenting for the following:  Pre-Op Exam          7/9/2024     7:08 AM   Additional Questions   Roomed by Salma SEVERINO CMA   Accompanied by Self         7/9/2024     7:08 AM   Patient  Reported Additional Medications   Patient reports taking the following new medications n/a     HPI related to upcoming procedure: Continued pain after surgery in October.         7/5/2024   Pre-Op Questionnaire   Have you ever had a heart attack or stroke? No   Have you ever had surgery on your heart or blood vessels, such as a stent placement, a coronary artery bypass, or surgery on an artery in your head, neck, heart, or legs? No   Do you have chest pain with activity? No   Do you have a history of heart failure? No   Do you currently have a cold, bronchitis or symptoms of other infection? No   Do you have a cough, shortness of breath, or wheezing? No   Do you or anyone in your family have previous history of blood clots? No   Do you or does anyone in your family have a serious bleeding problem such as prolonged bleeding following surgeries or cuts? No   Have you ever had problems with anemia or been told to take iron pills? No   Have you had any abnormal blood loss such as black, tarry or bloody stools, or abnormal vaginal bleeding? No   Have you ever had a blood transfusion? No   Are you willing to have a blood transfusion if it is medically needed before, during, or after your surgery? Yes   Have you or any of your relatives ever had problems with anesthesia? No   Do you have sleep apnea, excessive snoring or daytime drowsiness? (!) YES   Do you have a CPAP machine? Yes   Do you have any artifical heart valves or other implanted medical devices like a pacemaker, defibrillator, or continuous glucose monitor? No   Do you have artificial joints? No   Are you allergic to latex? (!) YES        Health Care Directive  Patient does not have a Health Care Directive or Living Will: Discussed advance care planning with patient; however, patient declined at this time.    Preoperative Review of    reviewed - controlled substances prescribed by other outside provider(s).          Patient Active Problem List     Diagnosis Date Noted    EDIS (obstructive sleep apnea) 09/14/2022     Priority: Medium     PSG 8/25/2022  Weight 265 lbs BMI 41.6  No REM sleep achieved  AHI 13, RDI 17, Lowest O2 SAT 91%  Periodic limb movement index 150/h, PLM arousal index 19.7/h        Restless legs syndrome (RLS) 09/14/2022     Priority: Medium    Prediabetes 03/09/2022     Priority: Medium    Anxiety 08/26/2021     Priority: Medium    Morbid obesity (H) 08/26/2021     Priority: Medium    H/O partial thyroidectomy 08/29/2019     Priority: Medium      Past Medical History:   Diagnosis Date    Depressive disorder 1/1/2000    Fracture of right shoulder 2018    Fracture of right wrist     HAYES (generalized anxiety disorder)     Moderate episode of recurrent major depressive disorder (H)     Thyroid disease 2016    Partial thyroidectomy due to cyst    Uncomplicated asthma 2000    Allergy induced     Past Surgical History:   Procedure Laterality Date    COLONOSCOPY  11/5/2015    DILATION AND CURETTAGE  2016    ENT SURGERY  6/1/2016    Partial Thyroidectomy    ORTHOPEDIC SURGERY  10/30/2023, 5/2024    Mary's Deformity and Achilles repair, Surgical repair left Mindle Finger    THYROIDECTOMY   2016    Partial, doesn't require meds     Current Outpatient Medications   Medication Sig Dispense Refill    busPIRone (BUSPAR) 10 MG tablet Take 1 tablet (10 mg) by mouth 2 times daily 180 tablet 3    PARoxetine (PAXIL) 30 MG tablet Take 1 tablet (30 mg) by mouth every morning 90 tablet 3    Semaglutide-Weight Management (WEGOVY) 0.5 MG/0.5ML pen Inject 0.5 mg Subcutaneous once a week      vitamin D3 (CHOLECALCIFEROL) 125 MCG (5000 UT) tablet          Allergies   Allergen Reactions    Gluten Meal      intolerance    Latex      Blistering    Mold Cough and Other (See Comments)    Monosodium Glutamate GI Disturbance, Headache and Nausea and Vomiting    Pollen Extract Cough and Other (See Comments)     Seasonal allergies    Bupropion Anxiety        Social History  "    Tobacco Use    Smoking status: Former     Current packs/day: 0.00     Average packs/day: 0.5 packs/day for 30.0 years (15.0 ttl pk-yrs)     Types: Cigarettes     Start date: 1980     Quit date: 2010     Years since quittin.5     Passive exposure: Never    Smokeless tobacco: Never    Tobacco comments:     Social smoker only   Substance Use Topics    Alcohol use: Yes     Alcohol/week: 7.0 standard drinks of alcohol     Types: 7 Glasses of wine per week     Comment: Had increased over the holidays but has now returned to norm       History   Drug Use No         Review of Systems  Constitutional, HEENT, cardiovascular, pulmonary, gi and gu systems are negative, except as otherwise noted.    Objective    /80   Pulse 112   Temp 97  F (36.1  C) (Tympanic)   Resp 16   Ht 1.689 m (5' 6.5\")   Wt 124.3 kg (274 lb)   SpO2 97%   BMI 43.56 kg/m     Estimated body mass index is 43.56 kg/m  as calculated from the following:    Height as of this encounter: 1.689 m (5' 6.5\").    Weight as of this encounter: 124.3 kg (274 lb).    Physical Exam  GENERAL: alert and no distress  EYES: Eyes grossly normal to inspection, PERRL and conjunctivae and sclerae normal  HENT: ear canals and TM's normal, nose and mouth without ulcers or lesions  NECK: no adenopathy, no asymmetry, masses, or scars  RESP: lungs clear to auscultation - no rales, rhonchi or wheezes  CV: regular rate and rhythm, normal S1 S2, no S3 or S4, no murmur, click or rub, no peripheral edema  ABDOMEN: soft, nontender, no hepatosplenomegaly, no masses and bowel sounds normal  MS: no gross musculoskeletal defects noted, no edema  SKIN: no suspicious lesions or rashes  NEURO: Normal strength and tone, mentation intact and speech normal  PSYCH: mentation appears normal, affect normal/bright    No results for input(s): \"HGB\", \"PLT\", \"INR\", \"NA\", \"POTASSIUM\", \"CR\", \"A1C\" in the last 8760 hours.     Diagnostics  No labs were ordered during this visit. "   No EKG required, no history of coronary heart disease, significant arrhythmia, peripheral arterial disease or other structural heart disease.    Revised Cardiac Risk Index (RCRI)  The patient has the following serious cardiovascular risks for perioperative complications:   - No serious cardiac risks = 0 points     RCRI Interpretation: 0 points: Class I (very low risk - 0.4% complication rate)     Signed Electronically by: Jen Patel PA-C  Copy of this evaluation report is provided to requesting physician.

## 2024-07-09 NOTE — PATIENT INSTRUCTIONS

## 2024-07-11 ENCOUNTER — TRANSFERRED RECORDS (OUTPATIENT)
Dept: HEALTH INFORMATION MANAGEMENT | Facility: CLINIC | Age: 59
End: 2024-07-11
Payer: COMMERCIAL

## 2024-07-30 ENCOUNTER — TRANSFERRED RECORDS (OUTPATIENT)
Dept: HEALTH INFORMATION MANAGEMENT | Facility: CLINIC | Age: 59
End: 2024-07-30
Payer: COMMERCIAL

## 2024-08-21 ENCOUNTER — TRANSFERRED RECORDS (OUTPATIENT)
Dept: HEALTH INFORMATION MANAGEMENT | Facility: CLINIC | Age: 59
End: 2024-08-21
Payer: COMMERCIAL

## 2024-08-27 ENCOUNTER — TRANSFERRED RECORDS (OUTPATIENT)
Dept: HEALTH INFORMATION MANAGEMENT | Facility: CLINIC | Age: 59
End: 2024-08-27
Payer: COMMERCIAL

## 2024-10-14 ENCOUNTER — TRANSFERRED RECORDS (OUTPATIENT)
Dept: HEALTH INFORMATION MANAGEMENT | Facility: CLINIC | Age: 59
End: 2024-10-14
Payer: COMMERCIAL

## 2025-04-16 ENCOUNTER — ANCILLARY PROCEDURE (OUTPATIENT)
Dept: MAMMOGRAPHY | Facility: CLINIC | Age: 60
End: 2025-04-16
Attending: NURSE PRACTITIONER
Payer: COMMERCIAL

## 2025-04-16 DIAGNOSIS — Z12.31 VISIT FOR SCREENING MAMMOGRAM: ICD-10-CM

## 2025-04-16 PROCEDURE — 77063 BREAST TOMOSYNTHESIS BI: CPT | Mod: TC | Performed by: RADIOLOGY

## 2025-04-16 PROCEDURE — 77067 SCR MAMMO BI INCL CAD: CPT | Mod: TC | Performed by: RADIOLOGY

## 2025-05-25 ENCOUNTER — TRANSFERRED RECORDS (OUTPATIENT)
Dept: HEALTH INFORMATION MANAGEMENT | Facility: CLINIC | Age: 60
End: 2025-05-25
Payer: COMMERCIAL

## 2025-06-04 ENCOUNTER — TRANSFERRED RECORDS (OUTPATIENT)
Dept: HEALTH INFORMATION MANAGEMENT | Facility: CLINIC | Age: 60
End: 2025-06-04
Payer: COMMERCIAL

## 2025-06-17 ENCOUNTER — TRANSFERRED RECORDS (OUTPATIENT)
Dept: HEALTH INFORMATION MANAGEMENT | Facility: CLINIC | Age: 60
End: 2025-06-17
Payer: COMMERCIAL

## 2025-06-18 ENCOUNTER — TRANSFERRED RECORDS (OUTPATIENT)
Dept: HEALTH INFORMATION MANAGEMENT | Facility: CLINIC | Age: 60
End: 2025-06-18
Payer: COMMERCIAL

## 2025-08-18 ENCOUNTER — OFFICE VISIT (OUTPATIENT)
Dept: PEDIATRICS | Facility: CLINIC | Age: 60
End: 2025-08-18
Payer: COMMERCIAL

## 2025-08-18 VITALS
RESPIRATION RATE: 18 BRPM | HEART RATE: 74 BPM | HEIGHT: 67 IN | SYSTOLIC BLOOD PRESSURE: 128 MMHG | TEMPERATURE: 97 F | BODY MASS INDEX: 44.57 KG/M2 | DIASTOLIC BLOOD PRESSURE: 80 MMHG | WEIGHT: 284 LBS | OXYGEN SATURATION: 97 %
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
## 2025-08-18 DIAGNOSIS — G47.33 OSA (OBSTRUCTIVE SLEEP APNEA): ICD-10-CM

## 2025-08-18 DIAGNOSIS — E66.813 CLASS 3 SEVERE OBESITY WITH SERIOUS COMORBIDITY AND BODY MASS INDEX (BMI) OF 45.0 TO 49.9 IN ADULT, UNSPECIFIED OBESITY TYPE (H): ICD-10-CM

## 2025-08-18 DIAGNOSIS — F41.1 GAD (GENERALIZED ANXIETY DISORDER): ICD-10-CM

## 2025-08-18 DIAGNOSIS — R73.03 PREDIABETES: ICD-10-CM

## 2025-08-18 DIAGNOSIS — Z01.818 PREOPERATIVE EXAMINATION: Primary | ICD-10-CM

## 2025-08-18 DIAGNOSIS — R74.8 ELEVATED LIVER ENZYMES: ICD-10-CM

## 2025-08-18 DIAGNOSIS — M17.12 PRIMARY OSTEOARTHRITIS OF LEFT KNEE: ICD-10-CM

## 2025-08-18 LAB
ALBUMIN SERPL BCG-MCNC: 4.2 G/DL (ref 3.5–5.2)
ALP SERPL-CCNC: 92 U/L (ref 40–150)
ALT SERPL W P-5'-P-CCNC: 68 U/L (ref 0–50)
ANION GAP SERPL CALCULATED.3IONS-SCNC: 11 MMOL/L (ref 7–15)
AST SERPL W P-5'-P-CCNC: 57 U/L (ref 0–45)
BILIRUB SERPL-MCNC: 0.4 MG/DL
BUN SERPL-MCNC: 16 MG/DL (ref 8–23)
CALCIUM SERPL-MCNC: 9.4 MG/DL (ref 8.8–10.4)
CHLORIDE SERPL-SCNC: 101 MMOL/L (ref 98–107)
CREAT SERPL-MCNC: 0.66 MG/DL (ref 0.51–0.95)
EGFRCR SERPLBLD CKD-EPI 2021: >90 ML/MIN/1.73M2
ERYTHROCYTE [DISTWIDTH] IN BLOOD BY AUTOMATED COUNT: 12.5 % (ref 10–15)
EST. AVERAGE GLUCOSE BLD GHB EST-MCNC: 117 MG/DL
GLUCOSE SERPL-MCNC: 91 MG/DL (ref 70–99)
HBA1C MFR BLD: 5.7 % (ref 0–5.6)
HCO3 SERPL-SCNC: 24 MMOL/L (ref 22–29)
HCT VFR BLD AUTO: 44.3 % (ref 35–47)
HGB BLD-MCNC: 15 G/DL (ref 11.7–15.7)
MCH RBC QN AUTO: 33.3 PG (ref 26.5–33)
MCHC RBC AUTO-ENTMCNC: 33.9 G/DL (ref 31.5–36.5)
MCV RBC AUTO: 98.2 FL (ref 78–100)
PLATELET # BLD AUTO: 226 10E3/UL (ref 150–450)
POTASSIUM SERPL-SCNC: 4 MMOL/L (ref 3.4–5.3)
PROT SERPL-MCNC: 7.8 G/DL (ref 6.4–8.3)
RBC # BLD AUTO: 4.51 10E6/UL (ref 3.8–5.2)
SODIUM SERPL-SCNC: 136 MMOL/L (ref 135–145)
WBC # BLD AUTO: 7.41 10E3/UL (ref 4–11)

## 2025-08-18 ASSESSMENT — PAIN SCALES - GENERAL: PAINLEVEL_OUTOF10: NO PAIN (0)

## 2025-08-19 ENCOUNTER — LAB (OUTPATIENT)
Dept: LAB | Facility: CLINIC | Age: 60
End: 2025-08-19
Payer: COMMERCIAL

## 2025-08-19 ENCOUNTER — RESULTS FOLLOW-UP (OUTPATIENT)
Dept: PEDIATRICS | Facility: CLINIC | Age: 60
End: 2025-08-19

## 2025-08-19 ENCOUNTER — NURSE TRIAGE (OUTPATIENT)
Dept: PEDIATRICS | Facility: CLINIC | Age: 60
End: 2025-08-19

## 2025-08-19 DIAGNOSIS — E66.813 CLASS 3 SEVERE OBESITY WITH SERIOUS COMORBIDITY AND BODY MASS INDEX (BMI) OF 45.0 TO 49.9 IN ADULT, UNSPECIFIED OBESITY TYPE (H): ICD-10-CM
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
## 2025-08-19 DIAGNOSIS — L08.9 INFECTED DOG BITE: Primary | ICD-10-CM

## 2025-08-19 PROBLEM — R74.8 ELEVATED LIVER ENZYMES: Status: ACTIVE | Noted: 2025-08-19

## 2025-08-19 LAB
CHOLEST SERPL-MCNC: 244 MG/DL
FASTING STATUS PATIENT QL REPORTED: YES
HDLC SERPL-MCNC: 53 MG/DL
LDLC SERPL CALC-MCNC: 163 MG/DL
NONHDLC SERPL-MCNC: 191 MG/DL
TRIGL SERPL-MCNC: 138 MG/DL

## 2025-08-19 PROCEDURE — 36415 COLL VENOUS BLD VENIPUNCTURE: CPT

## 2025-08-19 PROCEDURE — 80061 LIPID PANEL: CPT

## 2025-08-19 RX ORDER — METRONIDAZOLE 500 MG/1
500 TABLET ORAL 3 TIMES DAILY
Qty: 21 TABLET | Refills: 0 | Status: SHIPPED | OUTPATIENT
Start: 2025-08-19 | End: 2025-08-26

## 2025-08-19 RX ORDER — SULFAMETHOXAZOLE AND TRIMETHOPRIM 800; 160 MG/1; MG/1
1 TABLET ORAL 2 TIMES DAILY
Qty: 14 TABLET | Refills: 0 | Status: SHIPPED | OUTPATIENT
Start: 2025-08-19 | End: 2025-08-26

## 2025-08-25 ENCOUNTER — OFFICE VISIT (OUTPATIENT)
Dept: PEDIATRICS | Facility: CLINIC | Age: 60
End: 2025-08-25
Payer: COMMERCIAL

## 2025-08-25 ENCOUNTER — RESULTS FOLLOW-UP (OUTPATIENT)
Dept: PEDIATRICS | Facility: CLINIC | Age: 60
End: 2025-08-25

## 2025-08-25 VITALS
HEIGHT: 67 IN | RESPIRATION RATE: 16 BRPM | SYSTOLIC BLOOD PRESSURE: 146 MMHG | TEMPERATURE: 97.3 F | DIASTOLIC BLOOD PRESSURE: 86 MMHG | BODY MASS INDEX: 44.67 KG/M2 | WEIGHT: 284.6 LBS | HEART RATE: 83 BPM

## 2025-08-25 DIAGNOSIS — R03.0 ELEVATED BLOOD PRESSURE READING WITHOUT DIAGNOSIS OF HYPERTENSION: ICD-10-CM

## 2025-08-25 DIAGNOSIS — F41.1 GAD (GENERALIZED ANXIETY DISORDER): ICD-10-CM

## 2025-08-25 DIAGNOSIS — E66.813 CLASS 3 SEVERE OBESITY WITH SERIOUS COMORBIDITY AND BODY MASS INDEX (BMI) OF 40.0 TO 44.9 IN ADULT, UNSPECIFIED OBESITY TYPE (H): ICD-10-CM

## 2025-08-25 DIAGNOSIS — Z12.11 SCREEN FOR COLON CANCER: ICD-10-CM

## 2025-08-25 DIAGNOSIS — E78.5 HYPERLIPIDEMIA LDL GOAL <100: ICD-10-CM

## 2025-08-25 DIAGNOSIS — M17.12 PRIMARY OSTEOARTHRITIS OF LEFT KNEE: ICD-10-CM

## 2025-08-25 DIAGNOSIS — Z00.00 ENCOUNTER FOR PREVENTATIVE ADULT HEALTH CARE EXAMINATION: Primary | ICD-10-CM

## 2025-08-25 DIAGNOSIS — R74.8 ELEVATED LIVER ENZYMES: ICD-10-CM

## 2025-08-25 DIAGNOSIS — R73.03 PREDIABETES: ICD-10-CM

## 2025-08-25 DIAGNOSIS — Z12.83 SKIN CANCER SCREENING: ICD-10-CM

## 2025-08-25 PROCEDURE — 99396 PREV VISIT EST AGE 40-64: CPT | Performed by: NURSE PRACTITIONER

## 2025-08-25 PROCEDURE — G2211 COMPLEX E/M VISIT ADD ON: HCPCS | Performed by: NURSE PRACTITIONER

## 2025-08-25 PROCEDURE — 99213 OFFICE O/P EST LOW 20 MIN: CPT | Mod: 25 | Performed by: NURSE PRACTITIONER

## 2025-08-25 PROCEDURE — 1126F AMNT PAIN NOTED NONE PRSNT: CPT | Performed by: NURSE PRACTITIONER

## 2025-08-25 PROCEDURE — 3077F SYST BP >= 140 MM HG: CPT | Performed by: NURSE PRACTITIONER

## 2025-08-25 PROCEDURE — 93000 ELECTROCARDIOGRAM COMPLETE: CPT | Performed by: NURSE PRACTITIONER

## 2025-08-25 PROCEDURE — 3079F DIAST BP 80-89 MM HG: CPT | Performed by: NURSE PRACTITIONER

## 2025-08-25 RX ORDER — PAROXETINE 30 MG/1
30 TABLET, FILM COATED ORAL EVERY MORNING
Qty: 90 TABLET | Refills: 3 | Status: SHIPPED | OUTPATIENT
Start: 2025-08-25

## 2025-08-25 SDOH — HEALTH STABILITY: PHYSICAL HEALTH: ON AVERAGE, HOW MANY DAYS PER WEEK DO YOU ENGAGE IN MODERATE TO STRENUOUS EXERCISE (LIKE A BRISK WALK)?: 0 DAYS

## 2025-08-25 SDOH — HEALTH STABILITY: PHYSICAL HEALTH: ON AVERAGE, HOW MANY MINUTES DO YOU ENGAGE IN EXERCISE AT THIS LEVEL?: 0 MIN

## 2025-08-25 ASSESSMENT — PAIN SCALES - GENERAL: PAINLEVEL_OUTOF10: NO PAIN (0)

## 2025-08-26 ENCOUNTER — PATIENT OUTREACH (OUTPATIENT)
Dept: CARE COORDINATION | Facility: CLINIC | Age: 60
End: 2025-08-26
Payer: COMMERCIAL

## 2025-08-28 ENCOUNTER — PATIENT OUTREACH (OUTPATIENT)
Dept: CARE COORDINATION | Facility: CLINIC | Age: 60
End: 2025-08-28
Payer: COMMERCIAL